# Patient Record
Sex: MALE | Race: WHITE | NOT HISPANIC OR LATINO
[De-identification: names, ages, dates, MRNs, and addresses within clinical notes are randomized per-mention and may not be internally consistent; named-entity substitution may affect disease eponyms.]

---

## 2017-05-05 ENCOUNTER — APPOINTMENT (OUTPATIENT)
Dept: VASCULAR SURGERY | Facility: CLINIC | Age: 52
End: 2017-05-05

## 2017-05-05 VITALS
BODY MASS INDEX: 26.95 KG/M2 | DIASTOLIC BLOOD PRESSURE: 78 MMHG | OXYGEN SATURATION: 96 % | SYSTOLIC BLOOD PRESSURE: 122 MMHG | HEIGHT: 74 IN | HEART RATE: 57 BPM | WEIGHT: 210 LBS

## 2017-05-05 DIAGNOSIS — Z78.9 OTHER SPECIFIED HEALTH STATUS: ICD-10-CM

## 2018-02-23 ENCOUNTER — APPOINTMENT (OUTPATIENT)
Dept: VASCULAR SURGERY | Facility: CLINIC | Age: 53
End: 2018-02-23
Payer: COMMERCIAL

## 2018-02-23 DIAGNOSIS — I83.10 VARICOSE VEINS OF UNSPECIFIED LOWER EXTREMITY WITH INFLAMMATION: ICD-10-CM

## 2018-02-23 PROCEDURE — 99213 OFFICE O/P EST LOW 20 MIN: CPT

## 2018-03-13 ENCOUNTER — OTHER (OUTPATIENT)
Age: 53
End: 2018-03-13

## 2018-04-04 ENCOUNTER — APPOINTMENT (OUTPATIENT)
Dept: VASCULAR SURGERY | Facility: HOSPITAL | Age: 53
End: 2018-04-04

## 2018-04-04 ENCOUNTER — OUTPATIENT (OUTPATIENT)
Dept: OUTPATIENT SERVICES | Facility: HOSPITAL | Age: 53
LOS: 1 days | Discharge: HOME | End: 2018-04-04
Payer: COMMERCIAL

## 2018-04-04 PROCEDURE — 37765 STAB PHLEB VEINS XTR 10-20: CPT | Mod: LT,GC

## 2018-04-16 ENCOUNTER — APPOINTMENT (OUTPATIENT)
Dept: VASCULAR SURGERY | Facility: CLINIC | Age: 53
End: 2018-04-16
Payer: COMMERCIAL

## 2018-04-16 PROCEDURE — 99024 POSTOP FOLLOW-UP VISIT: CPT

## 2018-04-20 ENCOUNTER — OUTPATIENT (OUTPATIENT)
Dept: OUTPATIENT SERVICES | Facility: HOSPITAL | Age: 53
LOS: 1 days | End: 2018-04-20
Payer: COMMERCIAL

## 2018-04-20 DIAGNOSIS — Z01.818 ENCOUNTER FOR OTHER PREPROCEDURAL EXAMINATION: ICD-10-CM

## 2018-04-20 LAB
ALBUMIN SERPL ELPH-MCNC: 5 G/DL — SIGNIFICANT CHANGE UP (ref 3.3–5)
ALP SERPL-CCNC: 73 U/L — SIGNIFICANT CHANGE UP (ref 40–120)
ALT FLD-CCNC: 21 U/L — SIGNIFICANT CHANGE UP (ref 10–45)
ANION GAP SERPL CALC-SCNC: 11 MMOL/L — SIGNIFICANT CHANGE UP (ref 5–17)
APTT BLD: 36 SEC — SIGNIFICANT CHANGE UP (ref 27.5–37.4)
AST SERPL-CCNC: 20 U/L — SIGNIFICANT CHANGE UP (ref 10–40)
BILIRUB SERPL-MCNC: 1 MG/DL — SIGNIFICANT CHANGE UP (ref 0.2–1.2)
BUN SERPL-MCNC: 19 MG/DL — SIGNIFICANT CHANGE UP (ref 7–23)
CALCIUM SERPL-MCNC: 9.8 MG/DL — SIGNIFICANT CHANGE UP (ref 8.4–10.5)
CHLORIDE SERPL-SCNC: 100 MMOL/L — SIGNIFICANT CHANGE UP (ref 96–108)
CO2 SERPL-SCNC: 27 MMOL/L — SIGNIFICANT CHANGE UP (ref 22–31)
CREAT SERPL-MCNC: 0.92 MG/DL — SIGNIFICANT CHANGE UP (ref 0.5–1.3)
GLUCOSE SERPL-MCNC: 92 MG/DL — SIGNIFICANT CHANGE UP (ref 70–99)
HCT VFR BLD CALC: 46.1 % — SIGNIFICANT CHANGE UP (ref 39–50)
HGB BLD-MCNC: 16 G/DL — SIGNIFICANT CHANGE UP (ref 13–17)
INR BLD: 1.01 — SIGNIFICANT CHANGE UP (ref 0.88–1.16)
MCHC RBC-ENTMCNC: 30.9 PG — SIGNIFICANT CHANGE UP (ref 27–34)
MCHC RBC-ENTMCNC: 34.7 G/DL — SIGNIFICANT CHANGE UP (ref 32–36)
MCV RBC AUTO: 89 FL — SIGNIFICANT CHANGE UP (ref 80–100)
PLATELET # BLD AUTO: 180 K/UL — SIGNIFICANT CHANGE UP (ref 150–400)
POTASSIUM SERPL-MCNC: 4.2 MMOL/L — SIGNIFICANT CHANGE UP (ref 3.5–5.3)
POTASSIUM SERPL-SCNC: 4.2 MMOL/L — SIGNIFICANT CHANGE UP (ref 3.5–5.3)
PROT SERPL-MCNC: 7.2 G/DL — SIGNIFICANT CHANGE UP (ref 6–8.3)
PROTHROM AB SERPL-ACNC: 11.2 SEC — SIGNIFICANT CHANGE UP (ref 9.8–12.7)
RBC # BLD: 5.18 M/UL — SIGNIFICANT CHANGE UP (ref 4.2–5.8)
RBC # FLD: 13 % — SIGNIFICANT CHANGE UP (ref 10.3–16.9)
SODIUM SERPL-SCNC: 138 MMOL/L — SIGNIFICANT CHANGE UP (ref 135–145)
WBC # BLD: 3 K/UL — LOW (ref 3.8–10.5)
WBC # FLD AUTO: 3 K/UL — LOW (ref 3.8–10.5)

## 2018-04-20 PROCEDURE — 85610 PROTHROMBIN TIME: CPT

## 2018-04-20 PROCEDURE — 85027 COMPLETE CBC AUTOMATED: CPT

## 2018-04-20 PROCEDURE — 85730 THROMBOPLASTIN TIME PARTIAL: CPT

## 2018-04-20 PROCEDURE — 80053 COMPREHEN METABOLIC PANEL: CPT

## 2018-04-25 ENCOUNTER — OUTPATIENT (OUTPATIENT)
Dept: OUTPATIENT SERVICES | Facility: HOSPITAL | Age: 53
LOS: 1 days | Discharge: HOME | End: 2018-04-25
Payer: COMMERCIAL

## 2018-04-25 ENCOUNTER — RESULT REVIEW (OUTPATIENT)
Age: 53
End: 2018-04-25

## 2018-04-25 ENCOUNTER — APPOINTMENT (OUTPATIENT)
Dept: VASCULAR SURGERY | Facility: HOSPITAL | Age: 53
End: 2018-04-25

## 2018-04-25 PROCEDURE — 37765 STAB PHLEB VEINS XTR 10-20: CPT | Mod: 58,RT,GC

## 2018-05-04 LAB — SURGICAL PATHOLOGY STUDY: SIGNIFICANT CHANGE UP

## 2018-07-28 PROBLEM — I83.10 VARICOSE VEINS WITH INFLAMMATION, UNSPECIFIED LATERALITY: Status: ACTIVE | Noted: 2018-03-01

## 2021-05-18 ENCOUNTER — EMERGENCY (EMERGENCY)
Facility: HOSPITAL | Age: 56
LOS: 1 days | Discharge: HOME | End: 2021-05-18
Attending: EMERGENCY MEDICINE | Admitting: EMERGENCY MEDICINE
Payer: COMMERCIAL

## 2021-05-18 VITALS
SYSTOLIC BLOOD PRESSURE: 154 MMHG | HEIGHT: 74 IN | WEIGHT: 205.03 LBS | HEART RATE: 65 BPM | OXYGEN SATURATION: 98 % | TEMPERATURE: 98 F | DIASTOLIC BLOOD PRESSURE: 88 MMHG | RESPIRATION RATE: 17 BRPM

## 2021-05-18 VITALS
OXYGEN SATURATION: 97 % | RESPIRATION RATE: 16 BRPM | HEART RATE: 56 BPM | TEMPERATURE: 98 F | SYSTOLIC BLOOD PRESSURE: 161 MMHG | DIASTOLIC BLOOD PRESSURE: 88 MMHG

## 2021-05-18 DIAGNOSIS — R00.1 BRADYCARDIA, UNSPECIFIED: ICD-10-CM

## 2021-05-18 DIAGNOSIS — R00.2 PALPITATIONS: ICD-10-CM

## 2021-05-18 DIAGNOSIS — R06.4 HYPERVENTILATION: ICD-10-CM

## 2021-05-18 DIAGNOSIS — R07.89 OTHER CHEST PAIN: ICD-10-CM

## 2021-05-18 LAB
ALBUMIN SERPL ELPH-MCNC: 4.7 G/DL — SIGNIFICANT CHANGE UP (ref 3.3–5)
ALP SERPL-CCNC: 89 U/L — SIGNIFICANT CHANGE UP (ref 40–120)
ALT FLD-CCNC: 18 U/L — SIGNIFICANT CHANGE UP (ref 10–45)
ANION GAP SERPL CALC-SCNC: 12 MMOL/L — SIGNIFICANT CHANGE UP (ref 5–17)
AST SERPL-CCNC: 27 U/L — SIGNIFICANT CHANGE UP (ref 10–40)
BASOPHILS # BLD AUTO: 0.03 K/UL — SIGNIFICANT CHANGE UP (ref 0–0.2)
BASOPHILS NFR BLD AUTO: 0.9 % — SIGNIFICANT CHANGE UP (ref 0–2)
BILIRUB SERPL-MCNC: 0.4 MG/DL — SIGNIFICANT CHANGE UP (ref 0.2–1.2)
BUN SERPL-MCNC: 24 MG/DL — HIGH (ref 7–23)
CALCIUM SERPL-MCNC: 9.3 MG/DL — SIGNIFICANT CHANGE UP (ref 8.4–10.5)
CHLORIDE SERPL-SCNC: 103 MMOL/L — SIGNIFICANT CHANGE UP (ref 96–108)
CO2 SERPL-SCNC: 24 MMOL/L — SIGNIFICANT CHANGE UP (ref 22–31)
CREAT SERPL-MCNC: 0.86 MG/DL — SIGNIFICANT CHANGE UP (ref 0.5–1.3)
EOSINOPHIL # BLD AUTO: 0.15 K/UL — SIGNIFICANT CHANGE UP (ref 0–0.5)
EOSINOPHIL NFR BLD AUTO: 4.4 % — SIGNIFICANT CHANGE UP (ref 0–6)
GLUCOSE SERPL-MCNC: 114 MG/DL — HIGH (ref 70–99)
HCT VFR BLD CALC: 44.6 % — SIGNIFICANT CHANGE UP (ref 39–50)
HGB BLD-MCNC: 15.3 G/DL — SIGNIFICANT CHANGE UP (ref 13–17)
IMM GRANULOCYTES NFR BLD AUTO: 0.3 % — SIGNIFICANT CHANGE UP (ref 0–1.5)
LIDOCAIN IGE QN: 25 U/L — SIGNIFICANT CHANGE UP (ref 7–60)
LYMPHOCYTES # BLD AUTO: 1.28 K/UL — SIGNIFICANT CHANGE UP (ref 1–3.3)
LYMPHOCYTES # BLD AUTO: 37.4 % — SIGNIFICANT CHANGE UP (ref 13–44)
MCHC RBC-ENTMCNC: 30.2 PG — SIGNIFICANT CHANGE UP (ref 27–34)
MCHC RBC-ENTMCNC: 34.3 GM/DL — SIGNIFICANT CHANGE UP (ref 32–36)
MCV RBC AUTO: 88.1 FL — SIGNIFICANT CHANGE UP (ref 80–100)
MONOCYTES # BLD AUTO: 0.41 K/UL — SIGNIFICANT CHANGE UP (ref 0–0.9)
MONOCYTES NFR BLD AUTO: 12 % — SIGNIFICANT CHANGE UP (ref 2–14)
NEUTROPHILS # BLD AUTO: 1.54 K/UL — LOW (ref 1.8–7.4)
NEUTROPHILS NFR BLD AUTO: 45 % — SIGNIFICANT CHANGE UP (ref 43–77)
NRBC # BLD: 0 /100 WBCS — SIGNIFICANT CHANGE UP (ref 0–0)
PLATELET # BLD AUTO: 188 K/UL — SIGNIFICANT CHANGE UP (ref 150–400)
POTASSIUM SERPL-MCNC: 3.8 MMOL/L — SIGNIFICANT CHANGE UP (ref 3.5–5.3)
POTASSIUM SERPL-SCNC: 3.8 MMOL/L — SIGNIFICANT CHANGE UP (ref 3.5–5.3)
PROT SERPL-MCNC: 7.2 G/DL — SIGNIFICANT CHANGE UP (ref 6–8.3)
RBC # BLD: 5.06 M/UL — SIGNIFICANT CHANGE UP (ref 4.2–5.8)
RBC # FLD: 13.1 % — SIGNIFICANT CHANGE UP (ref 10.3–14.5)
SODIUM SERPL-SCNC: 139 MMOL/L — SIGNIFICANT CHANGE UP (ref 135–145)
TROPONIN T SERPL-MCNC: 0.01 NG/ML — SIGNIFICANT CHANGE UP (ref 0–0.01)
TROPONIN T SERPL-MCNC: 0.01 NG/ML — SIGNIFICANT CHANGE UP (ref 0–0.01)
WBC # BLD: 3.42 K/UL — LOW (ref 3.8–10.5)
WBC # FLD AUTO: 3.42 K/UL — LOW (ref 3.8–10.5)

## 2021-05-18 PROCEDURE — 99283 EMERGENCY DEPT VISIT LOW MDM: CPT

## 2021-05-18 PROCEDURE — 93005 ELECTROCARDIOGRAM TRACING: CPT

## 2021-05-18 PROCEDURE — 85025 COMPLETE CBC W/AUTO DIFF WBC: CPT

## 2021-05-18 PROCEDURE — 80053 COMPREHEN METABOLIC PANEL: CPT

## 2021-05-18 PROCEDURE — 36415 COLL VENOUS BLD VENIPUNCTURE: CPT

## 2021-05-18 PROCEDURE — 83690 ASSAY OF LIPASE: CPT

## 2021-05-18 PROCEDURE — 84484 ASSAY OF TROPONIN QUANT: CPT

## 2021-05-18 PROCEDURE — 99285 EMERGENCY DEPT VISIT HI MDM: CPT

## 2021-05-18 PROCEDURE — 93010 ELECTROCARDIOGRAM REPORT: CPT | Mod: NC

## 2021-05-18 RX ORDER — SODIUM CHLORIDE 9 MG/ML
1000 INJECTION INTRAMUSCULAR; INTRAVENOUS; SUBCUTANEOUS ONCE
Refills: 0 | Status: COMPLETED | OUTPATIENT
Start: 2021-05-18 | End: 2021-05-18

## 2021-05-18 RX ADMIN — SODIUM CHLORIDE 1000 MILLILITER(S): 9 INJECTION INTRAMUSCULAR; INTRAVENOUS; SUBCUTANEOUS at 03:26

## 2021-05-18 NOTE — ED PROVIDER NOTE - NSFOLLOWUPINSTRUCTIONS_ED_ALL_ED_FT
Palpitations    A palpitation is the feeling that your heartbeat is irregular or is faster than normal. It may feel like your heart is fluttering or skipping a beat. They may be caused by many things, including smoking, caffeine, alcohol, stress, and certain medicines. Although most causes of palpitations are not serious, palpitations can be a sign of a serious medical problem. Avoid caffeine, alcohol, and tobacco products at home. Try to reduce stress and anxiety and make sure to get plenty of rest.     SEEK IMMEDIATE MEDICAL CARE IF YOU HAVE ANY OF THE FOLLOWING SYMPTOMS: chest pain, shortness of breath, severe headache, dizziness/lightheadedness, or fainting. Palpitations    A palpitation is the feeling that your heartbeat is irregular or is faster than normal. It may feel like your heart is fluttering or skipping a beat. They may be caused by many things, including smoking, caffeine, alcohol, stress, and certain medicines. Although most causes of palpitations are not serious, palpitations can be a sign of a serious medical problem. Avoid caffeine, alcohol, and tobacco products at home. Try to reduce stress and anxiety and make sure to get plenty of rest.     SEEK IMMEDIATE MEDICAL CARE IF YOU HAVE ANY OF THE FOLLOWING SYMPTOMS: chest pain, shortness of breath, severe headache, dizziness/lightheadedness, or fainting.  Anxiety    Generalized anxiety disorder (THIAGO) is a mental disorder. It is defined as anxiety that is not necessarily related to specific events or activities or is out of proportion to said events. Symptoms include restlessness, fatigue, difficulty concentrations, irritability and difficulty concentrating. It may interfere with life functions, including relationships, work, and school. If you were started on a medication, make sure to take exactly as prescribed and follow up with a psychiatrist.    SEEK IMMEDIATE MEDICAL CARE IF YOU HAVE ANY OF THE FOLLOWING SYMPTOMS: thoughts about hurting killing yourself, thoughts about hurting or killing somebody else, hallucinations, or worsening depression.

## 2021-05-18 NOTE — ED PROVIDER NOTE - OBJECTIVE STATEMENT
The pt is a 57 y/o M, BIBA, c/o palpitations and The pt is a 57 y/o M, BIBA, c/o palpitations and fast breathing PTA. Pt states that was eating, suddenly developed L sided chest pressure, heart racing, and started to hyperventilate, symptoms lasted a few min and self resolved, currently feeling well, states that had same episode a wk ago. Has been taking supplements and working out over past wk, drinks 1 coffee/day. Denies cp at this time, sob, dyspnea, n/v/d, abd pain, dizziness, syncope, fevers, chills.

## 2021-05-18 NOTE — ED PROVIDER NOTE - PATIENT PORTAL LINK FT
You can access the FollowMyHealth Patient Portal offered by [facility] by registering at the following website: [email or web address]. By joining [employer]’s FollowMyHealth portal, you will also be able to view your health information using other applications (apps) compatible with our system.

## 2021-05-18 NOTE — ED PROVIDER NOTE - CARE PROVIDER_API CALL
Steven Palacios)  Cardiovascular Disease; Internal Medicine  Cardiology [facility], [address] [place] [address]  Phone: [phone]  Fax: [phone]  Follow Up Time:     Freddie Reyes [facility]  [address] [place] [address]  Phone: [phone]  Fax: [phone]  Follow Up Time:

## 2021-05-18 NOTE — ED ADULT NURSE NOTE - OBJECTIVE STATEMENT
Presenting to ED c/o chest pain and fast heart beat. Reports L sided CP started a few hours ago, followed by fast heart beat and dizziness. Denies prior medical hx, denies cardiac hx. No N/V. No diaphoresis. Reports CP has improved since. No other c/o voiced.

## 2021-05-18 NOTE — ED PROVIDER NOTE - CARE PROVIDERS DIRECT ADDRESSES
[email or web address]

## 2021-05-18 NOTE — ED PROVIDER NOTE - ATTENDING CONTRIBUTION TO CARE
episode of left sided chest discomfort followed by palpitaitons/ lightheaded/ chest tightness. lasted a few min and subsided. no syncope. asymptomatic in ed.  troponin neg x2 and ecg non ischemic making acs unlikely.  to f/u with cardiology. suspect anxiety, says he has had before.  return precautions discussed

## 2021-05-18 NOTE — ED PROVIDER NOTE - CLINICAL SUMMARY MEDICAL DECISION MAKING FREE TEXT BOX
pt c/o an episode of palpitations, hyperventilation PTA - all symptoms self resolved w/i min, currently asymptomatic, had same episode a wk ago, ekg sinus danny, labs   , given ivf, observed in ED w/o any symptoms, ? palpitations vs anxiety, no concern for acs or pe, hemodynamically stable, nad, stable for dc, to f/u w/pmd, pt understands and agrees w/plan, strict return precautions given pt c/o an episode of palpitations, hyperventilation PTA - all symptoms self resolved w/i min, currently asymptomatic, had same episode a wk ago, ekg sinus danny, labs wnl - trop neg x2, given ivf, observed in ED w/o any symptoms, ? palpitations vs anxiety, no concern for acs or pe, hemodynamically stable, nad, stable for dc, to f/u w/pmd, pt understands and agrees w/plan, strict return precautions given

## 2021-05-21 PROBLEM — Z78.9 OTHER SPECIFIED HEALTH STATUS: Chronic | Status: ACTIVE | Noted: 2021-05-18

## 2021-06-22 ENCOUNTER — APPOINTMENT (OUTPATIENT)
Dept: HEART AND VASCULAR | Facility: CLINIC | Age: 56
End: 2021-06-22
Payer: COMMERCIAL

## 2021-06-22 ENCOUNTER — NON-APPOINTMENT (OUTPATIENT)
Age: 56
End: 2021-06-22

## 2021-06-22 VITALS
DIASTOLIC BLOOD PRESSURE: 80 MMHG | SYSTOLIC BLOOD PRESSURE: 120 MMHG | HEART RATE: 77 BPM | TEMPERATURE: 97.8 F | HEIGHT: 74 IN | BODY MASS INDEX: 25.67 KG/M2 | WEIGHT: 200 LBS | OXYGEN SATURATION: 97 %

## 2021-06-22 DIAGNOSIS — Z80.9 FAMILY HISTORY OF MALIGNANT NEOPLASM, UNSPECIFIED: ICD-10-CM

## 2021-06-22 DIAGNOSIS — Z80.0 FAMILY HISTORY OF MALIGNANT NEOPLASM OF DIGESTIVE ORGANS: ICD-10-CM

## 2021-06-22 DIAGNOSIS — Z82.49 FAMILY HISTORY OF ISCHEMIC HEART DISEASE AND OTHER DISEASES OF THE CIRCULATORY SYSTEM: ICD-10-CM

## 2021-06-22 PROCEDURE — 99072 ADDL SUPL MATRL&STAF TM PHE: CPT

## 2021-06-22 PROCEDURE — 93000 ELECTROCARDIOGRAM COMPLETE: CPT

## 2021-06-22 PROCEDURE — 99203 OFFICE O/P NEW LOW 30 MIN: CPT

## 2021-06-22 NOTE — ASSESSMENT
[FreeTextEntry1] : Palps- c/w PSVT, ZIO patch and echo, lifestyle good.\par \par CP- Atypical- EST ordered, lipids UNK, glucose 114\par \par Aden Maintainence- rec he get the corona virus vaccine

## 2021-06-22 NOTE — PHYSICAL EXAM
[Well Developed] : well developed [Well Nourished] : well nourished [No Acute Distress] : no acute distress [Normal Conjunctiva] : normal conjunctiva [Normal S1, S2] : normal S1, S2 [No Murmur] : no murmur [No Rub] : no rub [No Gallop] : no gallop [Clear Lung Fields] : clear lung fields [Good Air Entry] : good air entry [No Respiratory Distress] : no respiratory distress  [Soft] : abdomen soft [Non Tender] : non-tender [No Masses/organomegaly] : no masses/organomegaly [Normal Bowel Sounds] : normal bowel sounds [Normal Gait] : normal gait [No Edema] : no edema [No Cyanosis] : no cyanosis [No Clubbing] : no clubbing [No Varicosities] : no varicosities [No Rash] : no rash [No Skin Lesions] : no skin lesions [Moves all extremities] : moves all extremities [No Focal Deficits] : no focal deficits [Normal Speech] : normal speech [Alert and Oriented] : alert and oriented [Normal memory] : normal memory [de-identified] : No JVD or bruits

## 2021-06-22 NOTE — REVIEW OF SYSTEMS
[SOB] : shortness of breath [Chest Discomfort] : chest discomfort [Palpitations] : palpitations [Under Stress] : under stress [Negative] : Heme/Lymph

## 2021-08-04 ENCOUNTER — APPOINTMENT (OUTPATIENT)
Dept: HEART AND VASCULAR | Facility: CLINIC | Age: 56
End: 2021-08-04

## 2021-08-25 ENCOUNTER — APPOINTMENT (OUTPATIENT)
Dept: HEART AND VASCULAR | Facility: CLINIC | Age: 56
End: 2021-08-25

## 2021-09-13 ENCOUNTER — APPOINTMENT (OUTPATIENT)
Dept: HEART AND VASCULAR | Facility: CLINIC | Age: 56
End: 2021-09-13
Payer: COMMERCIAL

## 2021-09-13 ENCOUNTER — APPOINTMENT (OUTPATIENT)
Dept: HEART AND VASCULAR | Facility: CLINIC | Age: 56
End: 2021-09-13

## 2021-09-13 VITALS
WEIGHT: 204.04 LBS | DIASTOLIC BLOOD PRESSURE: 74 MMHG | BODY MASS INDEX: 26.19 KG/M2 | HEART RATE: 70 BPM | OXYGEN SATURATION: 96 % | RESPIRATION RATE: 16 BRPM | HEIGHT: 74 IN | TEMPERATURE: 97.5 F | SYSTOLIC BLOOD PRESSURE: 120 MMHG

## 2021-09-13 DIAGNOSIS — R07.9 CHEST PAIN, UNSPECIFIED: ICD-10-CM

## 2021-09-13 DIAGNOSIS — R00.2 PALPITATIONS: ICD-10-CM

## 2021-09-13 PROCEDURE — 93015 CV STRESS TEST SUPVJ I&R: CPT

## 2021-09-13 PROCEDURE — 93306 TTE W/DOPPLER COMPLETE: CPT

## 2021-11-20 NOTE — REASON FOR VISIT
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  Got LH.  Also reports h/o palps and a ? panic attack.  Never smoked.  BP excellent. He went to [facility], trop (-), EKG NL.  Under stress, mother was ill, discord with wife, son  last year.

## 2022-03-31 ENCOUNTER — APPOINTMENT (OUTPATIENT)
Dept: HEART AND VASCULAR | Facility: CLINIC | Age: 57
End: 2022-03-31

## 2024-02-10 ENCOUNTER — NON-APPOINTMENT (OUTPATIENT)
Age: 59
End: 2024-02-10

## 2024-02-13 ENCOUNTER — APPOINTMENT (OUTPATIENT)
Dept: OTOLARYNGOLOGY | Facility: CLINIC | Age: 59
End: 2024-02-13

## 2024-02-28 ENCOUNTER — APPOINTMENT (OUTPATIENT)
Dept: OTOLARYNGOLOGY | Facility: CLINIC | Age: 59
End: 2024-02-28
Payer: COMMERCIAL

## 2024-02-28 VITALS
HEART RATE: 60 BPM | SYSTOLIC BLOOD PRESSURE: 136 MMHG | OXYGEN SATURATION: 97 % | HEIGHT: 74 IN | TEMPERATURE: 97.7 F | DIASTOLIC BLOOD PRESSURE: 87 MMHG | WEIGHT: 200 LBS | BODY MASS INDEX: 25.67 KG/M2

## 2024-02-28 DIAGNOSIS — Z78.9 OTHER SPECIFIED HEALTH STATUS: ICD-10-CM

## 2024-02-28 DIAGNOSIS — Z87.09 PERSONAL HISTORY OF OTHER DISEASES OF THE RESPIRATORY SYSTEM: ICD-10-CM

## 2024-02-28 DIAGNOSIS — J32.9 CHRONIC SINUSITIS, UNSPECIFIED: ICD-10-CM

## 2024-02-28 PROCEDURE — 99203 OFFICE O/P NEW LOW 30 MIN: CPT | Mod: 25

## 2024-02-28 PROCEDURE — 31231 NASAL ENDOSCOPY DX: CPT

## 2024-02-28 NOTE — HISTORY OF PRESENT ILLNESS
[de-identified] : 59-year-old male c/o sinus infection since a year ago. Pt states having recurrent sinus infection twice per year. Cold weather worsens symptoms. No headaches or cough. Right and frontal sinus pressure.

## 2024-02-28 NOTE — PROCEDURE
[Topical Lidocaine] : topical lidocaine [Flexible Endoscope] : examined with the flexible endoscope [FreeTextEntry6] : Small septal perforation. Clear and open sinus passages. No infection noted. s[ FESS DR Saucedo? 10 yrs ago Open windows get CT and possible sinus wash

## 2024-03-12 ENCOUNTER — APPOINTMENT (OUTPATIENT)
Dept: OTOLARYNGOLOGY | Facility: CLINIC | Age: 59
End: 2024-03-12
Payer: COMMERCIAL

## 2024-03-12 PROCEDURE — 99213 OFFICE O/P EST LOW 20 MIN: CPT

## 2024-03-12 NOTE — REASON FOR VISIT
[Subsequent Evaluation] : a subsequent evaluation for [FreeTextEntry2] : CT sinuses w CRS and DNS severe w perf

## 2024-03-21 ENCOUNTER — APPOINTMENT (OUTPATIENT)
Dept: OTOLARYNGOLOGY | Facility: CLINIC | Age: 59
End: 2024-03-21
Payer: COMMERCIAL

## 2024-03-21 PROCEDURE — 31000 IRRIGATION MAXILLARY SINUS: CPT | Mod: 50

## 2024-03-21 NOTE — PHYSICAL EXAM
[Midline] : trachea located in midline position [Normal] : orientation to person, place, and time: normal [] : septum deviated bilaterally [de-identified] : s[ FESS DR Saucedo? 10 yrs ago

## 2024-03-21 NOTE — PROCEDURE
[Topical Lidocaine] : topical lidocaine [FreeTextEntry6] : Persistent severe CRS sinus wash and steroid bath [Flexible Endoscope] : examined with the flexible endoscope

## 2025-01-25 ENCOUNTER — INPATIENT (INPATIENT)
Facility: HOSPITAL | Age: 60
LOS: 1 days | Discharge: HOME | End: 2025-01-27
Attending: STUDENT IN AN ORGANIZED HEALTH CARE EDUCATION/TRAINING PROGRAM | Admitting: STUDENT IN AN ORGANIZED HEALTH CARE EDUCATION/TRAINING PROGRAM
Payer: COMMERCIAL

## 2025-01-25 VITALS
DIASTOLIC BLOOD PRESSURE: 92 MMHG | HEIGHT: 74 IN | WEIGHT: 210.1 LBS | HEART RATE: 68 BPM | OXYGEN SATURATION: 97 % | RESPIRATION RATE: 18 BRPM | TEMPERATURE: 97 F | SYSTOLIC BLOOD PRESSURE: 159 MMHG

## 2025-01-25 LAB
ALBUMIN SERPL ELPH-MCNC: 4.5 G/DL — SIGNIFICANT CHANGE UP (ref 3.3–5)
ALP SERPL-CCNC: 77 U/L — SIGNIFICANT CHANGE UP (ref 40–120)
ALT FLD-CCNC: 20 U/L — SIGNIFICANT CHANGE UP (ref 10–45)
ANION GAP SERPL CALC-SCNC: 9 MMOL/L — SIGNIFICANT CHANGE UP (ref 5–17)
APTT BLD: 34.2 SEC — SIGNIFICANT CHANGE UP (ref 24.5–35.6)
AST SERPL-CCNC: 19 U/L — SIGNIFICANT CHANGE UP (ref 10–40)
BASOPHILS # BLD AUTO: 0.02 K/UL — SIGNIFICANT CHANGE UP (ref 0–0.2)
BASOPHILS NFR BLD AUTO: 0.6 % — SIGNIFICANT CHANGE UP (ref 0–2)
BILIRUB SERPL-MCNC: 0.5 MG/DL — SIGNIFICANT CHANGE UP (ref 0.2–1.2)
BUN SERPL-MCNC: 14 MG/DL — SIGNIFICANT CHANGE UP (ref 7–23)
CALCIUM SERPL-MCNC: 9.2 MG/DL — SIGNIFICANT CHANGE UP (ref 8.4–10.5)
CHLORIDE SERPL-SCNC: 104 MMOL/L — SIGNIFICANT CHANGE UP (ref 96–108)
CHOLEST SERPL-MCNC: 219 MG/DL — HIGH
CO2 SERPL-SCNC: 26 MMOL/L — SIGNIFICANT CHANGE UP (ref 22–31)
CREAT SERPL-MCNC: 0.87 MG/DL — SIGNIFICANT CHANGE UP (ref 0.5–1.3)
CRP SERPL-MCNC: <3 MG/L — SIGNIFICANT CHANGE UP (ref 0–4)
EGFR: 100 ML/MIN/1.73M2 — SIGNIFICANT CHANGE UP
EOSINOPHIL # BLD AUTO: 0.09 K/UL — SIGNIFICANT CHANGE UP (ref 0–0.5)
EOSINOPHIL NFR BLD AUTO: 2.7 % — SIGNIFICANT CHANGE UP (ref 0–6)
ERYTHROCYTE [SEDIMENTATION RATE] IN BLOOD: 3 MM/HR — SIGNIFICANT CHANGE UP
GLUCOSE SERPL-MCNC: 105 MG/DL — HIGH (ref 70–99)
HCT VFR BLD CALC: 45 % — SIGNIFICANT CHANGE UP (ref 39–50)
HDLC SERPL-MCNC: 51 MG/DL — SIGNIFICANT CHANGE UP
HGB BLD-MCNC: 14.9 G/DL — SIGNIFICANT CHANGE UP (ref 13–17)
IMM GRANULOCYTES NFR BLD AUTO: 0.3 % — SIGNIFICANT CHANGE UP (ref 0–0.9)
INR BLD: 0.96 — SIGNIFICANT CHANGE UP (ref 0.85–1.16)
LIPID PNL WITH DIRECT LDL SERPL: 141 MG/DL — HIGH
LYMPHOCYTES # BLD AUTO: 1.29 K/UL — SIGNIFICANT CHANGE UP (ref 1–3.3)
LYMPHOCYTES # BLD AUTO: 38.7 % — SIGNIFICANT CHANGE UP (ref 13–44)
MCHC RBC-ENTMCNC: 29.7 PG — SIGNIFICANT CHANGE UP (ref 27–34)
MCHC RBC-ENTMCNC: 33.1 G/DL — SIGNIFICANT CHANGE UP (ref 32–36)
MCV RBC AUTO: 89.6 FL — SIGNIFICANT CHANGE UP (ref 80–100)
MONOCYTES # BLD AUTO: 0.3 K/UL — SIGNIFICANT CHANGE UP (ref 0–0.9)
MONOCYTES NFR BLD AUTO: 9 % — SIGNIFICANT CHANGE UP (ref 2–14)
NEUTROPHILS # BLD AUTO: 1.62 K/UL — LOW (ref 1.8–7.4)
NEUTROPHILS NFR BLD AUTO: 48.7 % — SIGNIFICANT CHANGE UP (ref 43–77)
NON HDL CHOLESTEROL: 168 MG/DL — HIGH
NRBC # BLD: 0 /100 WBCS — SIGNIFICANT CHANGE UP (ref 0–0)
NRBC BLD-RTO: 0 /100 WBCS — SIGNIFICANT CHANGE UP (ref 0–0)
PLATELET # BLD AUTO: 169 K/UL — SIGNIFICANT CHANGE UP (ref 150–400)
POTASSIUM SERPL-MCNC: 4.3 MMOL/L — SIGNIFICANT CHANGE UP (ref 3.5–5.3)
POTASSIUM SERPL-SCNC: 4.3 MMOL/L — SIGNIFICANT CHANGE UP (ref 3.5–5.3)
PROT SERPL-MCNC: 7 G/DL — SIGNIFICANT CHANGE UP (ref 6–8.3)
PROTHROM AB SERPL-ACNC: 11.2 SEC — SIGNIFICANT CHANGE UP (ref 9.9–13.4)
RBC # BLD: 5.02 M/UL — SIGNIFICANT CHANGE UP (ref 4.2–5.8)
RBC # FLD: 13.2 % — SIGNIFICANT CHANGE UP (ref 10.3–14.5)
SODIUM SERPL-SCNC: 139 MMOL/L — SIGNIFICANT CHANGE UP (ref 135–145)
TRIGL SERPL-MCNC: 151 MG/DL — HIGH
WBC # BLD: 3.33 K/UL — LOW (ref 3.8–10.5)
WBC # FLD AUTO: 3.33 K/UL — LOW (ref 3.8–10.5)

## 2025-01-25 PROCEDURE — 70450 CT HEAD/BRAIN W/O DYE: CPT | Mod: 26,59

## 2025-01-25 PROCEDURE — 71045 X-RAY EXAM CHEST 1 VIEW: CPT | Mod: 26

## 2025-01-25 PROCEDURE — 70543 MRI ORBT/FAC/NCK W/O &W/DYE: CPT | Mod: 26

## 2025-01-25 PROCEDURE — 70498 CT ANGIOGRAPHY NECK: CPT | Mod: 26

## 2025-01-25 PROCEDURE — 70496 CT ANGIOGRAPHY HEAD: CPT | Mod: 26

## 2025-01-25 PROCEDURE — 99285 EMERGENCY DEPT VISIT HI MDM: CPT

## 2025-01-25 PROCEDURE — 70553 MRI BRAIN STEM W/O & W/DYE: CPT | Mod: 26

## 2025-01-25 RX ORDER — FLUORESCEIN SODIUM 0.6 MG/1
1 STRIP OPHTHALMIC ONCE
Refills: 0 | Status: COMPLETED | OUTPATIENT
Start: 2025-01-25 | End: 2025-01-25

## 2025-01-25 RX ADMIN — FLUORESCEIN SODIUM 1 APPLICATION(S): 0.6 STRIP OPHTHALMIC at 16:23

## 2025-01-25 NOTE — CONSULT NOTE ADULT - SUBJECTIVE AND OBJECTIVE BOX
Symptoms progressive worsening over the past 3 days. Initially started at blurry vision and has progressed to loss of vision in the inferior VF in the left eye. In the morning, bottom half of VF in the left eye is black and then improves during day with deficit only in the lower nasal quadrant in the left eye. Patient denies recent trauma, head injury, new weakness/numbness in arms and legs, slurred speech. Patient does not take any medications. Recent PCP visit 1 month ago with elevated cholesterol ("cholesterol level in the 400s"). PCP recommended starting medication however patient wanted to try dietary changes first. No smoking, alcohol use 1 beer/week.   **STROKE CONSULT NOTE**    HPI: 60y Male with PMHx of hx of HLD sent to the ED by optometrist for left eye dic edema that presented as left eye blurry vision associated with flashes and floaters x 3days. Gradual onset of blurry visoin. Initially started at blurry vision and has progressed to loss of vision in the inferior VF in the left eye. In the mornings, bottom half of VF in the left eye is black and then improves during day with deficit only in the lower nasal quadrant in the left eye. Patient denies recent trauma, head injury, new weakness/numbness in arms and legs, slurred speech. Patient does not take any medications. Recent PCP visit 1 month ago with elevated cholesterol ("cholesterol level in the 400s"). PCP recommended starting medication however patient wanted to try dietary changes first.     T(C): 36.4 (25 @ 14:04), Max: 36.4 (25 @ 14:04)  HR: 59 (25 @ 14:04) (59 - 68)  BP: 131/78 (25 @ 14:04) (131/78 - 159/92)  RR: 18 (25 @ 14:04) (18 - 18)  SpO2: 97% (25 @ 14:04) (97% - 97%)    PAST MEDICAL & SURGICAL HISTORY:  No pertinent past medical history          FAMILY HISTORY:      SOCIAL HISTORY:   Smoking status: no   Drinkin beer/week  Drug Use: no    ROS:   Constitutional: No fever, weight loss or fatigue  Eyes: No eye pain, visual disturbances, or discharge  ENMT:  No difficulty hearing, tinnitus; No sinus or throat pain  Neck: No pain or stiffness  Respiratory: No cough, wheezing, chills or hemoptysis  Cardiovascular: No chest pain, palpitations, shortness of breath, or leg swelling  Gastrointestinal: No abdominal pain. No nausea, vomiting or hematemesis; No diarrhea or constipation. Nohematochezia.  Genitourinary: No dysuria, frequency, hematuria or incontinence  Neurological: As per HPI      MEDICATIONS  (STANDING):    MEDICATIONS  (PRN):    Allergies    No Known Allergies    Intolerances      Vital Signs Last 24 Hrs  T(C): 36.4 (2025 14:04), Max: 36.4 (2025 14:04)  T(F): 97.5 (2025 14:04), Max: 97.5 (2025 14:04)  HR: 59 (2025 14:04) (59 - 68)  BP: 131/78 (2025 14:04) (131/78 - 159/92)  BP(mean): --  RR: 18 (2025 14:04) (18 - 18)  SpO2: 97% (2025 14:04) (97% - 97%)    Parameters below as of 2025 14:04  Patient On (Oxygen Delivery Method): room air        Physical exam:  Constitutional: No acute distress, conversant  Eyes: Anicteric sclerae, moist conjunctivae, see below for CNs  Neck: trachea midline, FROM, supple, no thyromegaly or lymphadenopathy  Pulmonary:  No use of accessory muscles  GI: Abdomen soft, non-distended, non-tender  Extremities: no edema    Neurologic:  -Mental status: Awake, alert, oriented to person, place, and time. Speech is fluent with intact naming, repetition, and comprehension, no dysarthria. Follows commands.   -Cranial nerves:   II: Visual fields are full to confrontation in right eye. Left eye with field cut in bottom half  III, IV, VI: Extraocular movements are intact without nystagmus. Pupils dilated b/l (from dilation drops after ophthalmology exam)  V:  Facial sensation V1-V3 equal and intact   VII: Face is symmetric with normal eye closure and smile  VIII: Hearing is bilaterally intact   Motor: Normal bulk and tone. No pronator drift. Strength bilateral upper extremity 5/5, bilateral lower extremities 5/5.  Sensation: Intact to light touch bilaterally. No neglect or extinction on double simultaneous testing.  Coordination: No dysmetria on finger-to-nose bilaterally    NIHSS: 1    Fingerstick Blood Glucose: CAPILLARY BLOOD GLUCOSE        LABS:                        14.9   3.33  )-----------( 169      ( 2025 16:18 )             45.0     -    139  |  104  |  14  ----------------------------<  105[H]  4.3   |  26  |  0.87    Ca    9.2      2025 16:18    TPro  7.0  /  Alb  4.5  /  TBili  0.5  /  DBili  x   /  AST  19  /  ALT  20  /  AlkPhos  77            Urinalysis Basic - ( 2025 16:18 )    Color: x / Appearance: x / SG: x / pH: x  Gluc: 105 mg/dL / Ketone: x  / Bili: x / Urobili: x   Blood: x / Protein: x / Nitrite: x   Leuk Esterase: x / RBC: x / WBC x   Sq Epi: x / Non Sq Epi: x / Bacteria: x        RADIOLOGY & ADDITIONAL STUDIES:

## 2025-01-25 NOTE — ED PROVIDER NOTE - PHYSICAL EXAMINATION
General:  Well appearing, no distress  HEENT:  No conjunctival injection, neck supple, no congestion   Chest:  Non-tender, no crepitance  Lungs:  Clear to auscultation bilaterally   Heart:  s1s2 normal, no murmur  Abdomen:  soft, non-tender, non-distended  :  Deferred  Rectal:  Deferred  Extremities: No edema, normal perfusion, no joint swelling or tenderness  Neuro:  Alert, conversant with clear, fluent speech, no facial droop or rash, no drift, motor/sensory intact General:  Well appearing, no distress  HEENT:  No conjunctival injection, neck supple, no congestion.  Full exam by Ophtho.  See note    Chest:  Non-tender, no crepitance  Lungs:  Clear to auscultation bilaterally   Heart:  s1s2 normal, no murmur  Abdomen:  soft, non-tender, non-distended  :  Deferred  Rectal:  Deferred  Extremities: No edema, normal perfusion, no joint swelling or tenderness  Neuro:  Alert, conversant with clear, fluent speech, no facial droop or rash, no drift, motor/sensory intact

## 2025-01-25 NOTE — ED ADULT NURSE NOTE - PAIN: PRESENCE, MLM
[FreeTextEntry1] : 31 yo G0 for [employer]. Denies any complaints at this time. Last GYN visit was approx 1 year ago, reports normal pap smear at that time. Denies h/o abnormal pap smears.  GYnhx: h/o dermoid cyst s/p laparoscopic removal at 20 yo, 8 cm and 12cm in size- denies complications.  H/o abnormal pap smear and colposcopy w/ LGSIL and normal pap smear after.  Sexually active using OCP's for birth control and happy w/ method. Desires pregnancy next 1-2 years. 
denies pain/discomfort (Rating = 0)

## 2025-01-25 NOTE — CONSULT NOTE ADULT - ASSESSMENT
60y Male with PMHx of hx of HLD sent to the ED by optometrist for left eye dic edema that presented as left eye blurry vision associated with flashes and floaters x 3days. Stroke consulted after ophthalmology evaluation c/f NAION. NIHSS 1 for visual field cut. ESR/CRP wnl.     - agree with CTH, CTA. For stroke assessment, would only need MRI brain non con short stroke protocol.  - if imaging negative for stroke, patient can be discharge on aspirin 81mg daily and atorvastatin 80mg daily. Patient can then follow up with stroke clinic. Office will call to schedule  - recommend discussing with gen neuro if patient needs an LP     Will f/u imaging results.  Case discussed with Dr. Ureña

## 2025-01-25 NOTE — ED ADULT TRIAGE NOTE - CHIEF COMPLAINT QUOTE
Pt presents to ED here for floaters and blurry vision L eye x 3 days pt was seen by his eye doctor and was told to come here for possible optic disc edema and tortous vessels OS. Pt A&Ox4, NAD and ambulatory.

## 2025-01-25 NOTE — ED PROVIDER NOTE - CLINICAL SUMMARY MEDICAL DECISION MAKING FREE TEXT BOX
59 yo M with visual changes to the left eye.  Case d/w Ophtho. who will see the patient in the ER. 59 yo M with visual changes to the left eye.  Case d/w Ophtho. who will see the patient in the ER.    Ophthalmology recommended further work up with labs, ct/cta, MR.  Orders placed and Neuro consult requested. 61 yo M with visual changes to the left eye.  Case d/w Ophtho. who will see the patient in the ER.    Ophthalmology recommended further work up with labs, ct/cta, MR.  Orders placed and Neuro consult requested.    Patient was evaluated by Ophthalmology, Stroke Neurology and then General Neurology with the final decision to admit for steroid treatment and further work up.  Patient remained well throughout ER visit and was informed of results and plan.

## 2025-01-25 NOTE — CONSULT NOTE ADULT - ASSESSMENT
60y male w/ pmhx/ochx of HLD presenting with inferior visual cut defect and blurry vision OS of 3 days duration. Sent in from optometrist office for optic disc edema OS.     # optic disc edema OS  - findings suggestive of NAION, less likely AAION vs. compressive lesion  - obtain CBC, ESR, CRP, lipid profile  - CTA head and neck  - MRI brain and orbits w/w/o con  - recommend neurology consult    *incomplete*    Outpatient follow-up: Patient should follow-up with his/her ophthalmologist or with [facility] Department of Ophthalmology upon discharge at the address below     [facility]  [address] [place] [address] [phone] 60y male w/ pmhx/ochx of HLD presenting with inferior visual cut defect and blurry vision OS of 3 days duration. Sent in from optometrist office for optic disc edema OS.     # optic disc edema OS  - rAPD, inferior hemidefect, and optic disc edema OS suggestive of NAION. Less likely AAION (denies GCA symptoms) vs. compressive lesion  - obtain CBC, ESR, CRP, lipid profile  - CTA head and neck  - MRI brain and orbits w/w/o con  - recommend neurology consult  - recommend follow-up with neuro-ophthalmology outpatient after discharge    Outpatient follow-up: Patient should follow-up with his/her ophthalmologist or with [facility] Department of Ophthalmology upon discharge at the address below     [facility]  [address] [place] [address] [phone] 60y male w/ pmhx/ochx of HLD presenting with inferior visual cut defect and blurry vision OS of 3 days duration. Sent in from optometrist office for optic disc edema OS.     # optic disc edema OS  - rAPD, inferior hemidefect, and optic disc edema OS suggestive of NAION. Less likely AAION (denies GCA symptoms) vs. compressive lesion  - obtain CBC, ESR, CRP, lipid profile  - CTA head and neck  - MRI brain and orbits w/w/o con  - recommend neurology consult for further CVA workup  - recommend follow-up with neuro-ophthalmology outpatient after discharge    Outpatient follow-up: Patient should follow-up with his/her ophthalmologist or with [facility] Department of Ophthalmology upon discharge at the address below     [facility] Op[facility]  [address] [place] [address] [phone]

## 2025-01-25 NOTE — ED PROVIDER NOTE - OBJECTIVE STATEMENT
59 yo M no PMH sent from an Optometrist for further evaluation of visual changes  Patient has noted foggy vision to 1/4-1/3 of his left eye x 3 days  He sometimes sees flashes of light and floaters which are worse in the morning  Optometrist's note reports optic disc edema and tortuous vessels with IOP 16/14  No: ha, numbness, weakness, changes in vision, speech or gait, cp, sob or other symptoms

## 2025-01-25 NOTE — ED ADULT NURSE NOTE - OBJECTIVE STATEMENT
Pt is a 61 yo M c/o intermittent floaters and blurry vision to R eye x 3 days. Denies pain, headache, speech changes, weakness. does not wear any corrective lenses. denies any recent injury/trauma, use of eye drops.  pt in nad, respirations even and unlabored. speaking in full and complete sentences. ambulatory independently with steady gait.  visual acuity performed and documented.

## 2025-01-25 NOTE — CONSULT NOTE ADULT - SUBJECTIVE AND OBJECTIVE BOX
[facility] DEPARTMENT OF OPHTHALMOLOGY - INITIAL ADULT CONSULT  -----------------------------------------------------------------------------------------------------------  Brandee Watson MD  -----------------------------------------------------------------------------------------------------------    HPI: 60M PMH HLD sent in from optometrist office Dr. Elo Lerma OD for optic disc edema OS. Pt reporting waking up with blurry vision in his inferior visual field left eye 3 days ago. Denies any inciting events. Reports he used to snore in the past but wife has not mentioned snoring recently. No new medications. Visual field defect was constant also associated with flashes and floaters. No curtain falling. Denies pain. Denies headaches, scalp tenderness, jaw claudication. Went to optometrist office today, undilated exam significant for optic disc edema OS with tortuous vessels - sent to ED for further workup. Last PCP visit 12/2024. Last eye exam 2 years ago - unremarkable exam. Overall feels well, no recent illnesses, sick contacts.      PAST MEDICAL & SURGICAL HISTORY:  No pertinent past medical history        Past Ocular History: denies surg/laser  Ophthalmic Medications: none  FAMILY HISTORY:   no glc/amd  Social History: no etoh/tobacco    MEDICATIONS  (STANDING):    MEDICATIONS  (PRN):    Allergies & Intolerances:   No Known Allergies    Review of Systems:  Constitutional: No fever, chills  Eyes: +blurry vision OS, +flashes, +floaters OS. No FBS, erythema, discharge, double vision, OU  Neuro: No tremors  Cardiovascular: No chest pain, palpitations  Respiratory: No SOB, no cough  GI: No nausea, vomiting, abdominal pain  : No dysuria  Skin: no rash  Psych: no depression  Endocrine: no polyuria, polydipsia  Heme/lymph: no swelling    VITALS: T(C): 36.4 (01-25-25 @ 14:04)  T(F): 97.5 (01-25-25 @ 14:04), Max: 97.5 (01-25-25 @ 14:04)  HR: 59 (01-25-25 @ 14:04) (59 - 68)  BP: 131/78 (01-25-25 @ 14:04) (131/78 - 159/92)  RR:  (18 - 18)  SpO2:  (97% - 97%)  Wt(kg): --  General: AAO x 3, appropriate mood and affect    Ophthalmology Exam:  Visual acuity (cc): 20/20 OD, 20/25 OS  Pupils: PERRL OU, trace rAPD OS  Ttono: 10, 8  Extraocular movements (EOMs): Full OU, no pain, no diplopia  Confrontational Visual Field (CVF): Full OD, inferior hemidefect OS  Color Plates: 12/12 OU    Pen Light Exam (PLE)  External: Flat OU  Lids/Lashes/Lacrimal Ducts: Flat OU    Sclera/Conjunctiva: W+Q OU  Cornea: Cl OU  Anterior Chamber: D+F OU    Iris: Flat OU  Lens: Cl OU    Fundus Exam: dilated with 1% tropicamide and 2.5% phenylephrine  Approval obtained from primary team for dilation  Patient aware that pupils can remained dilated for at least 4-6 hours  Exam performed with 20D lens    Vitreous: wnl OU  Disc, cup/disc: sharp and pink, 0.4 OU  Macula: wnl OU  Vessels: wnl OU  Periphery: wnl OU    Labs/Imaging:  ***   [facility] DEPARTMENT OF OPHTHALMOLOGY - INITIAL ADULT CONSULT  -----------------------------------------------------------------------------------------------------------  Brandee Watson MD  -----------------------------------------------------------------------------------------------------------    HPI: 60M PMH HLD sent in from optometrist office Dr. Elo Lerma OD for optic disc edema OS. Pt reporting waking up with blurry vision in his inferior visual field left eye 3 days ago. Denies any inciting events. Reports he used to snore in the past but wife has not mentioned snoring recently. No new medications. Not on Viagra. Visual field defect was constant also associated with flashes and floaters. No curtain falling. Denies pain. Denies headaches, scalp tenderness, jaw claudication. Went to optometrist office today, undilated exam significant for optic disc edema OS - sent to ED for further workup. Last PCP visit 12/2024. Last eye exam 2 years ago - unremarkable exam. Overall feels well, no recent illnesses, sick contacts.      PAST MEDICAL & SURGICAL HISTORY:  No pertinent past medical history        Past Ocular History: denies surg/laser  Ophthalmic Medications: none  FAMILY HISTORY:   no glc/amd  Social History: no etoh/tobacco    MEDICATIONS  (STANDING):    MEDICATIONS  (PRN):    Allergies & Intolerances:   No Known Allergies    Review of Systems:  Constitutional: No fever, chills  Eyes: +blurry vision OS, +flashes, +floaters OS. No FBS, erythema, discharge, double vision, OU  Neuro: No tremors  Cardiovascular: No chest pain, palpitations  Respiratory: No SOB, no cough  GI: No nausea, vomiting, abdominal pain  : No dysuria  Skin: no rash  Psych: no depression  Endocrine: no polyuria, polydipsia  Heme/lymph: no swelling    VITALS: T(C): 36.4 (01-25-25 @ 14:04)  T(F): 97.5 (01-25-25 @ 14:04), Max: 97.5 (01-25-25 @ 14:04)  HR: 59 (01-25-25 @ 14:04) (59 - 68)  BP: 131/78 (01-25-25 @ 14:04) (131/78 - 159/92)  RR:  (18 - 18)  SpO2:  (97% - 97%)  Wt(kg): --  General: AAO x 3, appropriate mood and affect    Ophthalmology Exam:  Visual acuity (cc): 20/20 OD, 20/25 OS  Pupils: PERRL OU, 1+ rAPD OS  Ttono: 10, 8  Extraocular movements (EOMs): Full OU, no pain, no diplopia  Confrontational Visual Field (CVF): Full OD, inferior hemidefect OS  Color Plates: 12/12 OU    Pen Light Exam (PLE)  External: Flat OU  Lids/Lashes/Lacrimal Ducts: Flat OU    Sclera/Conjunctiva: W+Q OU  Cornea: Cl OU  Anterior Chamber: D+F OU    Iris: Flat OU  Lens: Cl OU    Fundus Exam: dilated with 1% tropicamide and 2.5% phenylephrine  Approval obtained from primary team for dilation  Patient aware that pupils can remained dilated for at least 4-6 hours  Exam performed with 20D lens    Vitreous: wnl OU  Disc, cup/disc: sharp and pink 0.2 OD; 360 hyperemic optic disc edema most prominent superiorly OS  Macula: wnl OU  Vessels: wnl OU  Periphery: wnl OU

## 2025-01-26 LAB
A1C WITH ESTIMATED AVERAGE GLUCOSE RESULT: 5.4 % — SIGNIFICANT CHANGE UP (ref 4–5.6)
ALBUMIN SERPL ELPH-MCNC: 4.6 G/DL — SIGNIFICANT CHANGE UP (ref 3.3–5)
ALP SERPL-CCNC: 85 U/L — SIGNIFICANT CHANGE UP (ref 40–120)
ALT FLD-CCNC: 23 U/L — SIGNIFICANT CHANGE UP (ref 10–45)
ANION GAP SERPL CALC-SCNC: 14 MMOL/L — SIGNIFICANT CHANGE UP (ref 5–17)
ANTI-RIBONUCLEAR PROTEIN: <0.2 AI — SIGNIFICANT CHANGE UP
AST SERPL-CCNC: 26 U/L — SIGNIFICANT CHANGE UP (ref 10–40)
BASOPHILS # BLD AUTO: 0.03 K/UL — SIGNIFICANT CHANGE UP (ref 0–0.2)
BASOPHILS NFR BLD AUTO: 0.4 % — SIGNIFICANT CHANGE UP (ref 0–2)
BILIRUB SERPL-MCNC: 1.1 MG/DL — SIGNIFICANT CHANGE UP (ref 0.2–1.2)
BUN SERPL-MCNC: 13 MG/DL — SIGNIFICANT CHANGE UP (ref 7–23)
C3 SERPL-MCNC: 119 MG/DL — SIGNIFICANT CHANGE UP (ref 81–157)
C4 SERPL-MCNC: 20 MG/DL — SIGNIFICANT CHANGE UP (ref 13–39)
CALCIUM SERPL-MCNC: 9.5 MG/DL — SIGNIFICANT CHANGE UP (ref 8.4–10.5)
CHLORIDE SERPL-SCNC: 95 MMOL/L — LOW (ref 96–108)
CO2 SERPL-SCNC: 23 MMOL/L — SIGNIFICANT CHANGE UP (ref 22–31)
CREAT SERPL-MCNC: 0.94 MG/DL — SIGNIFICANT CHANGE UP (ref 0.5–1.3)
EGFR: 94 ML/MIN/1.73M2 — SIGNIFICANT CHANGE UP
ENA SM AB FLD QL: <0.2 AI — SIGNIFICANT CHANGE UP
EOSINOPHIL # BLD AUTO: 0.01 K/UL — SIGNIFICANT CHANGE UP (ref 0–0.5)
EOSINOPHIL NFR BLD AUTO: 0.1 % — SIGNIFICANT CHANGE UP (ref 0–6)
ESTIMATED AVERAGE GLUCOSE: 108 MG/DL — SIGNIFICANT CHANGE UP (ref 68–114)
FOLATE SERPL-MCNC: 11.8 NG/ML — SIGNIFICANT CHANGE UP
GLUCOSE SERPL-MCNC: 126 MG/DL — HIGH (ref 70–99)
HAV IGM SER-ACNC: SIGNIFICANT CHANGE UP
HBV CORE IGM SER-ACNC: SIGNIFICANT CHANGE UP
HBV SURFACE AG SER-ACNC: SIGNIFICANT CHANGE UP
HCT VFR BLD CALC: 47 % — SIGNIFICANT CHANGE UP (ref 39–50)
HCV AB S/CO SERPL IA: 0.05 S/CO — SIGNIFICANT CHANGE UP
HCV AB SERPL-IMP: SIGNIFICANT CHANGE UP
HCYS SERPL-MCNC: 9.2 UMOL/L — SIGNIFICANT CHANGE UP
HGB BLD-MCNC: 16.5 G/DL — SIGNIFICANT CHANGE UP (ref 13–17)
HIV 1+2 AB+HIV1 P24 AG SERPL QL IA: SIGNIFICANT CHANGE UP
IMM GRANULOCYTES NFR BLD AUTO: 0.4 % — SIGNIFICANT CHANGE UP (ref 0–0.9)
LYMPHOCYTES # BLD AUTO: 0.91 K/UL — LOW (ref 1–3.3)
LYMPHOCYTES # BLD AUTO: 12 % — LOW (ref 13–44)
MAGNESIUM SERPL-MCNC: 2 MG/DL — SIGNIFICANT CHANGE UP (ref 1.6–2.6)
MCHC RBC-ENTMCNC: 31.2 PG — SIGNIFICANT CHANGE UP (ref 27–34)
MCHC RBC-ENTMCNC: 35.1 G/DL — SIGNIFICANT CHANGE UP (ref 32–36)
MCV RBC AUTO: 88.8 FL — SIGNIFICANT CHANGE UP (ref 80–100)
MONOCYTES # BLD AUTO: 0.09 K/UL — SIGNIFICANT CHANGE UP (ref 0–0.9)
MONOCYTES NFR BLD AUTO: 1.2 % — LOW (ref 2–14)
NEUTROPHILS # BLD AUTO: 6.5 K/UL — SIGNIFICANT CHANGE UP (ref 1.8–7.4)
NEUTROPHILS NFR BLD AUTO: 85.9 % — HIGH (ref 43–77)
NRBC # BLD: 0 /100 WBCS — SIGNIFICANT CHANGE UP (ref 0–0)
NRBC BLD-RTO: 0 /100 WBCS — SIGNIFICANT CHANGE UP (ref 0–0)
PHOSPHATE SERPL-MCNC: 2.1 MG/DL — LOW (ref 2.5–4.5)
PLATELET # BLD AUTO: 170 K/UL — SIGNIFICANT CHANGE UP (ref 150–400)
POTASSIUM SERPL-MCNC: 4 MMOL/L — SIGNIFICANT CHANGE UP (ref 3.5–5.3)
POTASSIUM SERPL-SCNC: 4 MMOL/L — SIGNIFICANT CHANGE UP (ref 3.5–5.3)
PROT SERPL-MCNC: 7.7 G/DL — SIGNIFICANT CHANGE UP (ref 6–8.3)
RBC # BLD: 5.29 M/UL — SIGNIFICANT CHANGE UP (ref 4.2–5.8)
RBC # FLD: 13.2 % — SIGNIFICANT CHANGE UP (ref 10.3–14.5)
SODIUM SERPL-SCNC: 132 MMOL/L — LOW (ref 135–145)
TSH SERPL-MCNC: 1.15 UIU/ML — SIGNIFICANT CHANGE UP (ref 0.27–4.2)
VIT B12 SERPL-MCNC: 500 PG/ML — SIGNIFICANT CHANGE UP (ref 232–1245)
WBC # BLD: 7.57 K/UL — SIGNIFICANT CHANGE UP (ref 3.8–10.5)
WBC # FLD AUTO: 7.57 K/UL — SIGNIFICANT CHANGE UP (ref 3.8–10.5)

## 2025-01-26 PROCEDURE — 99223 1ST HOSP IP/OBS HIGH 75: CPT

## 2025-01-26 PROCEDURE — 72156 MRI NECK SPINE W/O & W/DYE: CPT | Mod: 26

## 2025-01-26 PROCEDURE — 72157 MRI CHEST SPINE W/O & W/DYE: CPT | Mod: 26

## 2025-01-26 RX ORDER — ENOXAPARIN SODIUM 100 MG/ML
40 INJECTION SUBCUTANEOUS EVERY 24 HOURS
Refills: 0 | Status: DISCONTINUED | OUTPATIENT
Start: 2025-01-26 | End: 2025-01-27

## 2025-01-26 RX ORDER — METHYLPREDNISOLONE ACETATE 40 MG/ML
1000 VIAL (ML) INJECTION DAILY
Refills: 0 | Status: DISCONTINUED | OUTPATIENT
Start: 2025-01-27 | End: 2025-01-27

## 2025-01-26 RX ORDER — GLUCAGON 3 MG/1
1 POWDER NASAL ONCE
Refills: 0 | Status: DISCONTINUED | OUTPATIENT
Start: 2025-01-26 | End: 2025-01-27

## 2025-01-26 RX ORDER — ATORVASTATIN CALCIUM 80 MG/1
80 TABLET, FILM COATED ORAL AT BEDTIME
Refills: 0 | Status: DISCONTINUED | OUTPATIENT
Start: 2025-01-26 | End: 2025-01-27

## 2025-01-26 RX ORDER — PANTOPRAZOLE 20 MG/1
40 TABLET, DELAYED RELEASE ORAL
Refills: 0 | Status: DISCONTINUED | OUTPATIENT
Start: 2025-01-26 | End: 2025-01-27

## 2025-01-26 RX ORDER — SODIUM CHLORIDE 9 G/ML
1000 INJECTION, SOLUTION INTRAVENOUS
Refills: 0 | Status: DISCONTINUED | OUTPATIENT
Start: 2025-01-26 | End: 2025-01-27

## 2025-01-26 RX ORDER — SOD PHOSPHATE,MONOBASIC-DIBAS 3MMOL/ML
15 VIAL (ML) INTRAVENOUS ONCE
Refills: 0 | Status: COMPLETED | OUTPATIENT
Start: 2025-01-26 | End: 2025-01-26

## 2025-01-26 RX ORDER — DM/PSEUDOEPHED/ACETAMINOPHEN 10-30-250
15 CAPSULE ORAL ONCE
Refills: 0 | Status: DISCONTINUED | OUTPATIENT
Start: 2025-01-26 | End: 2025-01-27

## 2025-01-26 RX ORDER — METHYLPREDNISOLONE ACETATE 40 MG/ML
250 VIAL (ML) INJECTION EVERY 6 HOURS
Refills: 0 | Status: DISCONTINUED | OUTPATIENT
Start: 2025-01-26 | End: 2025-01-26

## 2025-01-26 RX ORDER — DM/PSEUDOEPHED/ACETAMINOPHEN 10-30-250
25 CAPSULE ORAL ONCE
Refills: 0 | Status: DISCONTINUED | OUTPATIENT
Start: 2025-01-26 | End: 2025-01-27

## 2025-01-26 RX ORDER — INSULIN LISPRO 100/ML
VIAL (ML) SUBCUTANEOUS
Refills: 0 | Status: DISCONTINUED | OUTPATIENT
Start: 2025-01-26 | End: 2025-01-27

## 2025-01-26 RX ORDER — METHYLPREDNISOLONE ACETATE 40 MG/ML
750 VIAL (ML) INJECTION ONCE
Refills: 0 | Status: COMPLETED | OUTPATIENT
Start: 2025-01-26 | End: 2025-01-26

## 2025-01-26 RX ORDER — ASPIRIN 81 MG/1
81 TABLET, COATED ORAL DAILY
Refills: 0 | Status: DISCONTINUED | OUTPATIENT
Start: 2025-01-26 | End: 2025-01-27

## 2025-01-26 RX ORDER — DM/PSEUDOEPHED/ACETAMINOPHEN 10-30-250
12.5 CAPSULE ORAL ONCE
Refills: 0 | Status: DISCONTINUED | OUTPATIENT
Start: 2025-01-26 | End: 2025-01-27

## 2025-01-26 RX ADMIN — Medication 100 MILLIGRAM(S): at 07:11

## 2025-01-26 RX ADMIN — Medication 2: at 22:42

## 2025-01-26 RX ADMIN — ATORVASTATIN CALCIUM 80 MILLIGRAM(S): 80 TABLET, FILM COATED ORAL at 22:25

## 2025-01-26 RX ADMIN — Medication 62.5 MILLIMOLE(S): at 17:24

## 2025-01-26 RX ADMIN — ENOXAPARIN SODIUM 40 MILLIGRAM(S): 100 INJECTION SUBCUTANEOUS at 07:11

## 2025-01-26 RX ADMIN — Medication 2: at 17:54

## 2025-01-26 RX ADMIN — ASPIRIN 81 MILLIGRAM(S): 81 TABLET, COATED ORAL at 12:42

## 2025-01-26 RX ADMIN — Medication 100 MILLIGRAM(S): at 01:28

## 2025-01-26 RX ADMIN — Medication 2: at 09:18

## 2025-01-26 RX ADMIN — Medication 2: at 12:46

## 2025-01-26 RX ADMIN — PANTOPRAZOLE 40 MILLIGRAM(S): 20 TABLET, DELAYED RELEASE ORAL at 07:11

## 2025-01-26 RX ADMIN — Medication 100 MILLIGRAM(S): at 12:42

## 2025-01-26 NOTE — CONSULT NOTE ADULT - SUBJECTIVE AND OBJECTIVE BOX
Patient is a 60y old  Male who presents with a chief complaint of Vision change (26 Jan 2025 00:26)      INTERVAL HPI/OVERNIGHT EVENTS:  ICU Vital Signs Last 24 Hrs  T(C): 36.3 (26 Jan 2025 06:35), Max: 36.9 (25 Jan 2025 22:00)  T(F): 97.3 (26 Jan 2025 06:35), Max: 98.5 (25 Jan 2025 22:00)  HR: 65 (26 Jan 2025 06:35) (55 - 65)  BP: 148/78 (26 Jan 2025 06:35) (131/78 - 154/87)  BP(mean): --  ABP: --  ABP(mean): --  RR: 18 (26 Jan 2025 06:35) (18 - 18)  SpO2: 95% (26 Jan 2025 06:35) (95% - 97%)    O2 Parameters below as of 26 Jan 2025 06:35  Patient On (Oxygen Delivery Method): room air          I&O's Summary        LABS:                        16.5   7.57  )-----------( 170      ( 26 Jan 2025 05:30 )             47.0     01-26    132[L]  |  95[L]  |  13  ----------------------------<  126[H]  4.0   |  23  |  0.94    Ca    9.5      26 Jan 2025 05:30  Phos  2.1     01-26  Mg     2.0     01-26    TPro  7.7  /  Alb  4.6  /  TBili  1.1  /  DBili  x   /  AST  26  /  ALT  23  /  AlkPhos  85  01-26    PT/INR - ( 25 Jan 2025 23:38 )   PT: 11.2 sec;   INR: 0.96          PTT - ( 25 Jan 2025 23:38 )  PTT:34.2 sec  Urinalysis Basic - ( 26 Jan 2025 05:30 )    Color: x / Appearance: x / SG: x / pH: x  Gluc: 126 mg/dL / Ketone: x  / Bili: x / Urobili: x   Blood: x / Protein: x / Nitrite: x   Leuk Esterase: x / RBC: x / WBC x   Sq Epi: x / Non Sq Epi: x / Bacteria: x      CAPILLARY BLOOD GLUCOSE      POCT Blood Glucose.: 162 mg/dL (26 Jan 2025 09:03)        RADIOLOGY & ADDITIONAL TESTS:    Consultant(s) Notes Reviewed:  [x ] YES  [ ] NO    MEDICATIONS  (STANDING):  aspirin enteric coated 81 milliGRAM(s) Oral daily  atorvastatin 80 milliGRAM(s) Oral at bedtime  dextrose 5%. 1000 milliLiter(s) (100 mL/Hr) IV Continuous <Continuous>  dextrose 5%. 1000 milliLiter(s) (50 mL/Hr) IV Continuous <Continuous>  dextrose 50% Injectable 25 Gram(s) IV Push once  dextrose 50% Injectable 12.5 Gram(s) IV Push once  dextrose 50% Injectable 25 Gram(s) IV Push once  enoxaparin Injectable 40 milliGRAM(s) SubCutaneous every 24 hours  glucagon  Injectable 1 milliGRAM(s) IntraMuscular once  influenza   Vaccine 0.5 milliLiter(s) IntraMuscular once  insulin lispro (ADMELOG) corrective regimen sliding scale   SubCutaneous Before meals and at bedtime  methylPREDNISolone sodium succinate IVPB 750 milliGRAM(s) IV Intermittent once  pantoprazole    Tablet 40 milliGRAM(s) Oral before breakfast  sodium phosphate 15 milliMole(s)/250 mL IVPB 15 milliMole(s) IV Intermittent once    MEDICATIONS  (PRN):  dextrose Oral Gel 15 Gram(s) Oral once PRN Blood Glucose LESS THAN 70 milliGRAM(s)/deciliter      PHYSICAL EXAM:  GENERAL: well built, well nourished  HEAD:  Atraumatic, Normocephalic  EYES: EOMI, PERRLA, conjunctiva and sclera clear  ENT: No tonsillar erythema, exudates, or enlargement; Moist mucous membranes, Good dentition, No lesions  NECK: Supple, No JVD, Normal thyroid, no enlarged nodes  NERVOUS SYSTEM:  Alert & Oriented X3, Good concentration; Motor Strength 5/5 B/L upper and lower extremities; DTRs 2+ intact and symmetric, sensory intact  CHEST/LUNG: B/L good air entry; No rales, rhonchi, or wheezing  HEART: S1S2 normal, no S3, Regular rate and rhythm; No murmurs, rubs, or gallops  ABDOMEN: Soft, Nontender, Nondistended; Bowel sounds present  EXTREMITIES:  2+ Peripheral Pulses, No clubbing, cyanosis, or edema  LYMPH: No lymphadenopathy noted  SKIN: No rashes or lesions    Care Discussed with Consultants/Other Providers [ x] YES  [ ] NO Patient is a 60y old  Male who presents with a chief complaint of Vision change (26 Jan 2025 00:26)      INTERVAL HPI  Pt seen at bedside. appears well. in NAD. states his L eye vission is blurry. denies any other symptoms. no headache, CP, SOB, abd pain, NV.     Vital Signs Last 24 Hrs  T(C): 36.3 (26 Jan 2025 06:35), Max: 36.9 (25 Jan 2025 22:00)  T(F): 97.3 (26 Jan 2025 06:35), Max: 98.5 (25 Jan 2025 22:00)  HR: 65 (26 Jan 2025 06:35) (55 - 65)  BP: 148/78 (26 Jan 2025 06:35) (131/78 - 154/87)  BP(mean): --  ABP: --  ABP(mean): --  RR: 18 (26 Jan 2025 06:35) (18 - 18)  SpO2: 95% (26 Jan 2025 06:35) (95% - 97%)    O2 Parameters below as of 26 Jan 2025 06:35  Patient On (Oxygen Delivery Method): room air          I&O's Summary        LABS:                        16.5   7.57  )-----------( 170      ( 26 Jan 2025 05:30 )             47.0     01-26    132[L]  |  95[L]  |  13  ----------------------------<  126[H]  4.0   |  23  |  0.94    Ca    9.5      26 Jan 2025 05:30  Phos  2.1     01-26  Mg     2.0     01-26    TPro  7.7  /  Alb  4.6  /  TBili  1.1  /  DBili  x   /  AST  26  /  ALT  23  /  AlkPhos  85  01-26    PT/INR - ( 25 Jan 2025 23:38 )   PT: 11.2 sec;   INR: 0.96          PTT - ( 25 Jan 2025 23:38 )  PTT:34.2 sec  Urinalysis Basic - ( 26 Jan 2025 05:30 )    Color: x / Appearance: x / SG: x / pH: x  Gluc: 126 mg/dL / Ketone: x  / Bili: x / Urobili: x   Blood: x / Protein: x / Nitrite: x   Leuk Esterase: x / RBC: x / WBC x   Sq Epi: x / Non Sq Epi: x / Bacteria: x      CAPILLARY BLOOD GLUCOSE      POCT Blood Glucose.: 162 mg/dL (26 Jan 2025 09:03)        RADIOLOGY & ADDITIONAL TESTS:    Consultant(s) Notes Reviewed:  [x ] YES  [ ] NO    MEDICATIONS  (STANDING):  aspirin enteric coated 81 milliGRAM(s) Oral daily  atorvastatin 80 milliGRAM(s) Oral at bedtime  dextrose 5%. 1000 milliLiter(s) (100 mL/Hr) IV Continuous <Continuous>  dextrose 5%. 1000 milliLiter(s) (50 mL/Hr) IV Continuous <Continuous>  dextrose 50% Injectable 25 Gram(s) IV Push once  dextrose 50% Injectable 12.5 Gram(s) IV Push once  dextrose 50% Injectable 25 Gram(s) IV Push once  enoxaparin Injectable 40 milliGRAM(s) SubCutaneous every 24 hours  glucagon  Injectable 1 milliGRAM(s) IntraMuscular once  influenza   Vaccine 0.5 milliLiter(s) IntraMuscular once  insulin lispro (ADMELOG) corrective regimen sliding scale   SubCutaneous Before meals and at bedtime  methylPREDNISolone sodium succinate IVPB 750 milliGRAM(s) IV Intermittent once  pantoprazole    Tablet 40 milliGRAM(s) Oral before breakfast  sodium phosphate 15 milliMole(s)/250 mL IVPB 15 milliMole(s) IV Intermittent once    MEDICATIONS  (PRN):  dextrose Oral Gel 15 Gram(s) Oral once PRN Blood Glucose LESS THAN 70 milliGRAM(s)/deciliter      PHYSICAL EXAM:  GENERAL: well built, well nourished  HEAD:  Atraumatic, Normocephalic  EYES: EOMI, PERRLA, conjunctiva and sclera clear  NERVOUS SYSTEM:  Alert & Oriented X3, Good concentration; Motor Strength 5/5 B/L upper and lower extremities; sensory intact  CHEST/LUNG: B/L good air entry; No rales, rhonchi, or wheezing  HEART: S1S2 normal, no S3, Regular rate and rhythm; No murmurs, rubs, or gallops  ABDOMEN: Soft, Nontender, Nondistended; Bowel sounds present  EXTREMITIES:  2+ Peripheral Pulses, No clubbing, cyanosis, or edema      Care Discussed with Consultants/Other Providers [ x] YES  [ ] NO

## 2025-01-26 NOTE — CONSULT NOTE ADULT - ASSESSMENT
60M PMHx of HLD sent by optometrist for left optic disc edema, 3 days of left eye blurry vision, flashes/floaters. CTH/CTA negative for acute stroke. MRI with papilledema and hyperintensity of intraorbital optic nerve, concerning for optic neuritis vs non-arteritic anterior ischemic optic neuropathy (NAION)    #Blurry vision: likely Optic neuritis vs NAION  -c/w steroids per neuro   -ISS, PPI  -plan for LP  management per primary     #HLD  -cw atorvastatin 40mg QD    #Hyponatremia: corrected 133. likely hypovolemic. asymptomatic   -f/up urine lytes   -encourage PO intake   -trend BMP. consider fluid bolus if not improved.     #DVT ppx  -c/w lovenox

## 2025-01-26 NOTE — PHYSICAL THERAPY INITIAL EVALUATION ADULT - PERTINENT HX OF CURRENT PROBLEM, REHAB EVAL
Pt. is a 60 y.o male p/w 3 days of L eye vision changes with lower field darkening and associated flashes and floaters. Pt. denies trauma/injury. Pt. has been admitted for further neuro w/u/ concern for optic neuritis.

## 2025-01-26 NOTE — H&P ADULT - ATTENDING COMMENTS
60-year-old man with history of hyperlipidemia presenting with monocular right eye visual disturbance with inferior altitudinal visual deficit and optic disc edema with intraorbital T2 signal change on MRI.  Admitted for ischemic or inflammatory optic neuropathy, started on aspirin statin and pulsed dose steroids.  Pending MRI cervical and thoracic spine with and without gadolinium and lumbar puncture. 60-year-old man with history of hyperlipidemia presenting with monocular right eye visual disturbance with inferior altitudinal visual deficit and optic disc edema with intraorbital T2 FLAIR signal change on MRI with prominent optic nerve sheath edema.  No white matter brain lesions to suggest multiple sclerosis. Admitted for ischemic or inflammatory optic neuropathy, started on aspirin statin and pulsed dose steroids.  Pending MRI cervical and thoracic spine with and without gadolinium and lumbar puncture. Leading diagnostic impression at this time is not nonarteritic ischemic optic neuropathy however cannot definitively rule out inflammatory cause

## 2025-01-26 NOTE — PHYSICAL THERAPY INITIAL EVALUATION ADULT - MODALITIES TREATMENT COMMENTS
No cranial nerve deficits except decreased vision in L eye in inferior field with nasal quadrant being more affected than temporal. No associated coordination or balance deficits. Speech fluent.

## 2025-01-26 NOTE — H&P ADULT - ATTENDING SUPERVISION STATEMENT
Resident

## 2025-01-26 NOTE — H&P ADULT - HISTORY OF PRESENT ILLNESS
Patient is a 59yo man with PMHx of HLD sent to the ED by optometrist for 3 days of left eye blurry vision associated with flashes and floaters, found to have left eye disc edema. Initially patient noticed blurry vision, but later he sees only black in the inferior visual fields, temporal quadrant darker than nasal. There is also fluctuation of vision with some improvement during the day. Patient denies any pain with eye movement, double vision, headache, jaw pain, weakness/numbness, urinary/bowel change, slurred speech, memory deficit, chest discomfort, palpitation, or shortness of breath. He denies recent trauma, head injury, new medication, sick contact.. He does not take any medications although PCP recommended statin for HLD.

## 2025-01-26 NOTE — H&P ADULT - ASSESSMENT
60M PMHx of HLD sent by optometrist for left optic disc edema, 3 days of left eye blurry vision, flashes/floaters. CTH/CTA negative for acute stroke. MRI with papilledema and hyperintensity of intraorbital optic nerve, concerning for optic neuritis vs non-arteritic anterior ischemic optic neuropathy (NAION), which prompts steroid treatment and further workup including MR spine and LP.    Plan:    #Blurred vision with inferior hemidefect and rAPD, likely Optic neuritis vs NAION  - obtain labs (MOG serum, NMO serum, Coag, VZV, Hep B, Hep C, HIV, Syphilis, JAVAN virus (stratify antibody), Quantiferon gold, MICHELLE, JACKIE, dsDNA, C3, C4, Encephalitis panel, Neurofilament, Immunofixation, TSH, B12, homocysteine, MMA, anticardiolipin)  - obtain MR cervical and thoracic spine w/ and w/o IV contrast  - start IV Methylprednisolone 250mg QID, may change to 1g qD  - c/w aspirin 81 qD and atorvastatin 80mg qHS  - plan for lumbar puncture  - start Protonix and mISS while on steroids  - follow-up with neuro-ophthalmology outpatient after discharge    #HLD   on admission  - c/w atorvastatin 80mg qHS    #MISC  E: replete K<4, Mg<2  N: regular  VTE Prophylaxis: Lovenox  GI: PPI  Activity: AAT  C: Full Code  D: Floor

## 2025-01-26 NOTE — H&P ADULT - NSHPLABSRESULTS_GEN_ALL_CORE
LABS:                         14.9   3.33  )-----------( 169      ( 25 Jan 2025 16:18 )             45.0     01-25    139  |  104  |  14  ----------------------------<  105[H]  4.3   |  26  |  0.87    Ca    9.2      25 Jan 2025 16:18    TPro  7.0  /  Alb  4.5  /  TBili  0.5  /  DBili  x   /  AST  19  /  ALT  20  /  AlkPhos  77  01-25    PT/INR - ( 25 Jan 2025 23:38 )   PT: 11.2 sec;   INR: 0.96          PTT - ( 25 Jan 2025 23:38 )  PTT:34.2 sec  Urinalysis Basic - ( 25 Jan 2025 16:18 )    Color: x / Appearance: x / SG: x / pH: x  Gluc: 105 mg/dL / Ketone: x  / Bili: x / Urobili: x   Blood: x / Protein: x / Nitrite: x   Leuk Esterase: x / RBC: x / WBC x   Sq Epi: x / Non Sq Epi: x / Bacteria: x          RADIOLOGY, EKG & ADDITIONAL TESTS:   < from: CT Head No Cont (01.25.25 @ 17:23) >    CT HEAD:  No acute intracranial hemorrhage or mass effect.    CTA NECK:  No arterial steno-occlusive disease or evidence of dissection.    CTA HEAD:  No arterial steno-occlusive disease or evidence of aneurysm.    Apparent distention of optic nerve sheaths, MRI is pending for further optic nerve assessment. Query papilledema and correlate with clinical/funduscopic exam.          < from: MR Head w/wo IV Cont (01.25.25 @ 19:22)  Preliminary>    No acute intracranial abnormality. No recent ischemia, hemorrhage, or mass.          < from: MR Orbits w/wo IV Cont (01.25.25 @ 19:22)  Preliminary>    1.  Mildly prominent optic nerve sheath fluid, can be seen with papilledema.  2.   Central STIR hyperintensity within the intraorbital optic nerves may reflect sequela optic neuritis.

## 2025-01-26 NOTE — H&P ADULT - NSHPPHYSICALEXAM_GEN_ALL_CORE
General: Well-developed, well nourished, in no acute distress.  Eyes: Conjunctiva and sclera clear.  Neurologic:  - Mental Status:  Alert, awake, oriented to person, place, and time; Speech is fluent with intact naming, repetition, and comprehension; Good overall fund of knowledge.  - Cranial Nerves II-XII:    II:  Visual fields full OD, inferior visual field cut OS; RAPD; Pupils are equal, round, dilated b/l (from dilation drops after ophthalmology exam).  III, IV, VI:  Extraocular movements are intact without nystagmus.  V:  Facial sensation is intact in the V1-V3 distribution bilaterally.  VII:  Face is symmetric with normal eye closure and smile  VIII:  Hearing is intact to finger rub.  IX, X:  Uvula is midline and soft palate rises symmetrically  XI:  Head turning and shoulder shrug are intact.  XII:  Tongue protrudes in the midline.  - Motor:  Strength is 5/5 throughout. There is no pronator drift.  Normal muscle bulk and tone throughout.  - Reflexes:  2+ and symmetric at the biceps, triceps, brachioradialis, knees, and ankles.  Plantar responses flexor.  - Sensory:  Intact throughout to vibration, and joint-position, light touch, pin prick.  - Coordination:  Finger-nose-finger and heel-knee-shin intact without dysmetria.  Rapid alternating hand movements intact.  - Gait: Narrow, steady. Romberg testing is negative.

## 2025-01-26 NOTE — PHYSICAL THERAPY INITIAL EVALUATION ADULT - PREDICTED DURATION OF THERAPY (DAYS/WKS), PT EVAL
Pt. remains fully functionally independent and does not require further in-patient PT f/u at this point.

## 2025-01-27 ENCOUNTER — TRANSCRIPTION ENCOUNTER (OUTPATIENT)
Age: 60
End: 2025-01-27

## 2025-01-27 VITALS
DIASTOLIC BLOOD PRESSURE: 56 MMHG | SYSTOLIC BLOOD PRESSURE: 123 MMHG | OXYGEN SATURATION: 91 % | TEMPERATURE: 98 F | HEART RATE: 69 BPM | RESPIRATION RATE: 18 BRPM

## 2025-01-27 DIAGNOSIS — E87.1 HYPO-OSMOLALITY AND HYPONATREMIA: ICD-10-CM

## 2025-01-27 DIAGNOSIS — H53.8 OTHER VISUAL DISTURBANCES: ICD-10-CM

## 2025-01-27 DIAGNOSIS — E78.5 HYPERLIPIDEMIA, UNSPECIFIED: ICD-10-CM

## 2025-01-27 LAB
B2 GLYCOPROT1 IGA SER QL: <2 U/ML — SIGNIFICANT CHANGE UP
B2 GLYCOPROT1 IGG SER-ACNC: <1.4 U/ML — SIGNIFICANT CHANGE UP
B2 GLYCOPROT1 IGM SER-ACNC: 19.8 U/ML — SIGNIFICANT CHANGE UP
CARDIOLIPIN IGM SER-MCNC: 23.7 MPL U/ML — HIGH
CARDIOLIPIN IGM SER-MCNC: <1.6 GPL U/ML — SIGNIFICANT CHANGE UP
DEPRECATED CARDIOLIPIN IGA SER: 2.2 APL U/ML — SIGNIFICANT CHANGE UP
DRVVT RATIO: 0.9 RATIO — SIGNIFICANT CHANGE UP (ref 0–1.03)
DRVVT SCREEN TO CONFIRM RATIO: SIGNIFICANT CHANGE UP
JCPYV DNA SPEC QL NAA+PROBE: SIGNIFICANT CHANGE UP COPIES/ML
T PALLIDUM AB TITR SER: NEGATIVE — SIGNIFICANT CHANGE UP

## 2025-01-27 PROCEDURE — 86038 ANTINUCLEAR ANTIBODIES: CPT

## 2025-01-27 PROCEDURE — 99285 EMERGENCY DEPT VISIT HI MDM: CPT

## 2025-01-27 PROCEDURE — 87798 DETECT AGENT NOS DNA AMP: CPT

## 2025-01-27 PROCEDURE — 85598 HEXAGNAL PHOSPH PLTLT NEUTRL: CPT

## 2025-01-27 PROCEDURE — 97161 PT EVAL LOW COMPLEX 20 MIN: CPT

## 2025-01-27 PROCEDURE — 86140 C-REACTIVE PROTEIN: CPT

## 2025-01-27 PROCEDURE — 86147 CARDIOLIPIN ANTIBODY EA IG: CPT

## 2025-01-27 PROCEDURE — 70498 CT ANGIOGRAPHY NECK: CPT | Mod: MC

## 2025-01-27 PROCEDURE — 84100 ASSAY OF PHOSPHORUS: CPT

## 2025-01-27 PROCEDURE — 70450 CT HEAD/BRAIN W/O DYE: CPT | Mod: MC

## 2025-01-27 PROCEDURE — 80061 LIPID PANEL: CPT

## 2025-01-27 PROCEDURE — 85306 CLOT INHIBIT PROT S FREE: CPT

## 2025-01-27 PROCEDURE — A9585: CPT

## 2025-01-27 PROCEDURE — 85613 RUSSELL VIPER VENOM DILUTED: CPT

## 2025-01-27 PROCEDURE — 70543 MRI ORBT/FAC/NCK W/O &W/DYE: CPT | Mod: MC

## 2025-01-27 PROCEDURE — 86053 AQAPRN-4 ANTB FLO CYTMTRY EA: CPT

## 2025-01-27 PROCEDURE — 70496 CT ANGIOGRAPHY HEAD: CPT | Mod: MC

## 2025-01-27 PROCEDURE — 36415 COLL VENOUS BLD VENIPUNCTURE: CPT

## 2025-01-27 PROCEDURE — 82607 VITAMIN B-12: CPT

## 2025-01-27 PROCEDURE — 83036 HEMOGLOBIN GLYCOSYLATED A1C: CPT

## 2025-01-27 PROCEDURE — 86036 ANCA SCREEN EACH ANTIBODY: CPT

## 2025-01-27 PROCEDURE — 86362 MOG-IGG1 ANTB CBA EACH: CPT

## 2025-01-27 PROCEDURE — 85025 COMPLETE CBC W/AUTO DIFF WBC: CPT

## 2025-01-27 PROCEDURE — 85730 THROMBOPLASTIN TIME PARTIAL: CPT

## 2025-01-27 PROCEDURE — 87389 HIV-1 AG W/HIV-1&-2 AB AG IA: CPT

## 2025-01-27 PROCEDURE — 83884 ASSAY NEURFLMNT LIGHT CHAIN: CPT

## 2025-01-27 PROCEDURE — 86146 BETA-2 GLYCOPROTEIN ANTIBODY: CPT

## 2025-01-27 PROCEDURE — 83519 RIA NONANTIBODY: CPT

## 2025-01-27 PROCEDURE — 84443 ASSAY THYROID STIM HORMONE: CPT

## 2025-01-27 PROCEDURE — 82746 ASSAY OF FOLIC ACID SERUM: CPT

## 2025-01-27 PROCEDURE — 86255 FLUORESCENT ANTIBODY SCREEN: CPT

## 2025-01-27 PROCEDURE — 83735 ASSAY OF MAGNESIUM: CPT

## 2025-01-27 PROCEDURE — 85610 PROTHROMBIN TIME: CPT

## 2025-01-27 PROCEDURE — 85303 CLOT INHIBIT PROT C ACTIVITY: CPT

## 2025-01-27 PROCEDURE — 82962 GLUCOSE BLOOD TEST: CPT

## 2025-01-27 PROCEDURE — 85652 RBC SED RATE AUTOMATED: CPT

## 2025-01-27 PROCEDURE — 83090 ASSAY OF HOMOCYSTEINE: CPT

## 2025-01-27 PROCEDURE — 80053 COMPREHEN METABOLIC PANEL: CPT

## 2025-01-27 PROCEDURE — 71045 X-RAY EXAM CHEST 1 VIEW: CPT

## 2025-01-27 PROCEDURE — 86334 IMMUNOFIX E-PHORESIS SERUM: CPT

## 2025-01-27 PROCEDURE — 97166 OT EVAL MOD COMPLEX 45 MIN: CPT

## 2025-01-27 PROCEDURE — 86480 TB TEST CELL IMMUN MEASURE: CPT

## 2025-01-27 PROCEDURE — 70553 MRI BRAIN STEM W/O & W/DYE: CPT | Mod: MC

## 2025-01-27 PROCEDURE — 86341 ISLET CELL ANTIBODY: CPT

## 2025-01-27 PROCEDURE — 86235 NUCLEAR ANTIGEN ANTIBODY: CPT

## 2025-01-27 PROCEDURE — 85307 ASSAY ACTIVATED PROTEIN C: CPT

## 2025-01-27 PROCEDURE — 87799 DETECT AGENT NOS DNA QUANT: CPT

## 2025-01-27 PROCEDURE — 85300 ANTITHROMBIN III ACTIVITY: CPT

## 2025-01-27 PROCEDURE — 86780 TREPONEMA PALLIDUM: CPT

## 2025-01-27 PROCEDURE — 72157 MRI CHEST SPINE W/O & W/DYE: CPT | Mod: MC

## 2025-01-27 PROCEDURE — 80074 ACUTE HEPATITIS PANEL: CPT

## 2025-01-27 PROCEDURE — 86160 COMPLEMENT ANTIGEN: CPT

## 2025-01-27 PROCEDURE — 72156 MRI NECK SPINE W/O & W/DYE: CPT | Mod: MC

## 2025-01-27 PROCEDURE — 99232 SBSQ HOSP IP/OBS MODERATE 35: CPT

## 2025-01-27 RX ORDER — ASPIRIN 81 MG/1
1 TABLET, COATED ORAL
Qty: 90 | Refills: 0
Start: 2025-01-27 | End: 2025-04-26

## 2025-01-27 RX ORDER — ATORVASTATIN CALCIUM 80 MG/1
1 TABLET, FILM COATED ORAL
Qty: 90 | Refills: 0
Start: 2025-01-27 | End: 2025-04-26

## 2025-01-27 RX ADMIN — ENOXAPARIN SODIUM 40 MILLIGRAM(S): 100 INJECTION SUBCUTANEOUS at 06:35

## 2025-01-27 RX ADMIN — Medication 2: at 17:50

## 2025-01-27 RX ADMIN — Medication 4: at 13:10

## 2025-01-27 RX ADMIN — PANTOPRAZOLE 40 MILLIGRAM(S): 20 TABLET, DELAYED RELEASE ORAL at 06:35

## 2025-01-27 RX ADMIN — Medication 2: at 08:37

## 2025-01-27 RX ADMIN — Medication 50 MILLIGRAM(S): at 07:09

## 2025-01-27 RX ADMIN — ASPIRIN 81 MILLIGRAM(S): 81 TABLET, COATED ORAL at 11:28

## 2025-01-27 NOTE — DISCHARGE NOTE PROVIDER - HOSPITAL COURSE
60M PMHx of HLD sent by optometrist for left optic disc edema, 3 days of left eye blurry vision, flashes/floaters. CTH/CTA negative for acute stroke. MRI with  prominent optic nerve sheath fluid c/f papilledema and STIR hyperintensity within the intraorbital optic nerves possibly reflecting sequela optic neuritis. As such, he was empirically started on steroids for c/f optic neuritis. MRI brain C and T spine w/ and w/o contrast were obtained which did not show any demyelination. Ophtho eval was more suggestion of NAION. Thus LP was not pursued and steroids were discontinued.      #Blurred vision likely NAION.  - c/w aspirin 81 qD and atorvastatin 80mg qHS  - Pt requesting TTE be done on an outpatient basis.  - follow-up with neuro-ophthalmology and stroke outpatient  - . TSH 1.15. A1c 5.4    Exam on discharge:  Physical Exam: General: Well-developed, well nourished, in no acute distress.  Eyes: Conjunctiva and sclera clear.  Neurologic:  - Mental Status:  Alert, awake, oriented to person, place, and time; Speech is fluent with intact naming, repetition, and comprehension; Good overall fund of knowledge.  - Cranial Nerves II-XII:    II:  Visual fields full OD, inferior nasal visual field cut OS; RAPD; Pupils are equal, round and reactive  III, IV, VI:  Extraocular movements are intact without nystagmus.  V:  Facial sensation is intact in the V1-V3 distribution bilaterally.  VII:  Face is symmetric with normal eye closure and smile  VIII:  Hearing is intact to finger rub.  IX, X:  Uvula is midline and soft palate rises symmetrically  XI:  Head turning and shoulder shrug are intact.  XII:  Tongue protrudes in the midline.  - Motor:  Strength is 5/5 throughout. There is no pronator drift.  Normal muscle bulk and tone throughout.  - Reflexes:  2+ and symmetric at the biceps, triceps, brachioradialis, knees, and ankles.  Plantar responses flexor.  - Sensory:  Intact throughout to vibration, and joint-position, light touch, pin prick.  - Coordination:  Finger-nose-finger and heel-knee-shin intact without dysmetria.  Rapid alternating hand movements intact.  - Gait: Narrow, steady. Romberg testing is negative. 60M PMHx of HLD sent by optometrist for left optic disc edema, 3 days of left eye blurry vision, flashes/floaters. CTH/CTA negative for acute stroke. MRI with  prominent optic nerve sheath fluid c/f papilledema and STIR hyperintensity within the intraorbital optic nerves possibly reflecting sequela optic neuritis. As such, he was empirically started on steroids for c/f optic neuritis. MRI brain C and T spine w/ and w/o contrast were obtained which did not show any demyelination. Ophtho eval was more suggestion of NAION. Thus LP was not pursued and steroids were discontinued.      #Blurred vision likely NAION.  - c/w aspirin 81 qD and atorvastatin 80mg qHS  - Pt requesting TTE be done on an outpatient basis.  - follow-up with neuro-ophthalmology and stroke outpatient  - . TSH 1.15. A1c 5.4    Exam on discharge:  Physical Exam: General: Well-developed, well nourished, in no acute distress.  Eyes: Conjunctiva and sclera clear.  Neurologic:  - Mental Status:  Alert, awake, oriented to person, place, and time; Speech is fluent with intact naming, repetition, and comprehension; Good overall fund of knowledge.  - Cranial Nerves II-XII:    II:  Visual fields full OD, inferior nasal visual field cut OS; RAPD; Pupils are equal, round and reactive  III, IV, VI:  Extraocular movements are intact without nystagmus.  V:  Facial sensation is intact in the V1-V3 distribution bilaterally.  VII:  Face is symmetric with normal eye closure and smile  VIII:  Hearing is intact to finger rub.  IX, X:  Uvula is midline and soft palate rises symmetrically  XI:  Head turning and shoulder shrug are intact.  XII:  Tongue protrudes in the midline.  - Motor:  Strength is 5/5 throughout. There is no pronator drift.  Normal muscle bulk and tone throughout.  - Reflexes:  2+ and symmetric at the biceps, triceps, brachioradialis, knees, and ankles.  Plantar responses flexor.  - Sensory:  Intact throughout to vibration, and joint-position, light touch, pin prick.  - Coordination:  Finger-nose-finger and heel-knee-shin intact without dysmetria.  Rapid alternating hand movements intact.  - Gait: Narrow, steady. Romberg testing is negative.    New medications on discharge: None  Labs to be followed up: None  Imaging to be done as outpatient: TTE w/o contrast w/doppler  Further outpatient workup: f/u w/Dr. Gibbs within 2 weeks and Dr. Adrian within 2 weeks. 60M PMHx of HLD sent by optometrist for left optic disc edema, 3 days of left eye blurry vision, flashes/floaters. CTH/CTA negative for acute stroke. MRI with  prominent optic nerve sheath fluid c/f papilledema and STIR hyperintensity within the intraorbital optic nerves possibly reflecting sequela optic neuritis. As such, he was empirically started on steroids for c/f optic neuritis. MRI brain C and T spine w/ and w/o contrast were obtained which did not show any demyelination. Ophtho eval was more suggestion of NAION. Thus LP was not pursued and steroids were discontinued.      #Blurred vision likely NAION.  - c/w aspirin 81 qD and atorvastatin 80mg qHS  - Pt requesting TTE be done on an outpatient basis.  - follow-up with neuro-ophthalmology and stroke outpatient  - . TSH 1.15. A1c 5.4    Exam on discharge:  Physical Exam: General: Well-developed, well nourished, in no acute distress.  Eyes: Conjunctiva and sclera clear.  Neurologic:  - Mental Status:  Alert, awake, oriented to person, place, and time; Speech is fluent with intact naming, repetition, and comprehension; Good overall fund of knowledge.  - Cranial Nerves II-XII:    II:  Visual fields full OD, inferior nasal visual field cut OS; RAPD; Pupils are equal, round and reactive  III, IV, VI:  Extraocular movements are intact without nystagmus.  V:  Facial sensation is intact in the V1-V3 distribution bilaterally.  VII:  Face is symmetric with normal eye closure and smile  VIII:  Hearing is intact to finger rub.  IX, X:  Uvula is midline and soft palate rises symmetrically  XI:  Head turning and shoulder shrug are intact.  XII:  Tongue protrudes in the midline.  - Motor:  Strength is 5/5 throughout. There is no pronator drift.  Normal muscle bulk and tone throughout.  - Reflexes:  2+ and symmetric at the biceps, triceps, brachioradialis, knees, and ankles.  Plantar responses flexor.  - Sensory:  Intact throughout to vibration, and joint-position, light touch, pin prick.  - Coordination:  Finger-nose-finger and heel-knee-shin intact without dysmetria.  Rapid alternating hand movements intact.  - Gait: Narrow, steady. Romberg testing is negative.    New medications on discharge: aspirin 81 mg daily and atorvastatin 80 mg nightly  Labs to be followed up: None  Imaging to be done as outpatient: TTE w/o contrast w/doppler  Further outpatient workup: f/u w/Dr. Gibbs within 2 weeks and Dr. Adrian within 2 weeks.

## 2025-01-27 NOTE — PROGRESS NOTE ADULT - ASSESSMENT
60M PMHx of HLD sent by optometrist for left optic disc edema, 3 days of left eye blurry vision, flashes/floaters. CTH/CTA negative for acute stroke. MRI with papilledema and hyperintensity of intraorbital optic nerve, concerning for optic neuritis vs non-arteritic anterior ischemic optic neuropathy (NAION)    #Blurry vision: likely Optic neuritis vs NAION  - management per primary     #HLD  -cw atorvastatin 40mg QD    #Hyponatremia: corrected 133. likely hypovolemic. asymptomatic   - ctm    #DVT ppx  -c/w lovenox

## 2025-01-27 NOTE — OCCUPATIONAL THERAPY INITIAL EVALUATION ADULT - DIAGNOSIS, OT EVAL
Pt presents with impaired L eye inferior nasal quadrant however no other deficits impacting their ability to independently complete ADLs, functional transfers, and functional mobility.

## 2025-01-27 NOTE — DISCHARGE NOTE NURSING/CASE MANAGEMENT/SOCIAL WORK - PATIENT PORTAL LINK FT
You can access the FollowMyHealth Patient Portal offered by [facility] by registering at the following website: [email or web address]. By joining [employer]’s FollowMyHealth portal, you will also be able to view your health information using other applications (apps) compatible with our system.

## 2025-01-27 NOTE — PROGRESS NOTE ADULT - SUBJECTIVE AND OBJECTIVE BOX
Patient is a 60y old  Male who presents with a chief complaint of Vision change (26 Jan 2025 12:03)        SUBJECTIVE:  Patient was seen and examined at bedside, no acute complaints    Overnight Events : NAEON    Last Bowel Movement: 25-Jan-2025 (01-27)  Last Bowel Movement: 25-Jan-2025 (01-26)  Last Bowel Movement: 25-Jan-2025 (01-26)  Last Bowel Movement: 25-Jan-2025 (01-26)      Review of systems: 12 point Review of systems negative unless otherwise documented elsewhere in note.     Diet, Regular (01-26-25 @ 00:11) [Active]      MEDICATIONS:  MEDICATIONS  (STANDING):  aspirin enteric coated 81 milliGRAM(s) Oral daily  atorvastatin 80 milliGRAM(s) Oral at bedtime  dextrose 5%. 1000 milliLiter(s) (100 mL/Hr) IV Continuous <Continuous>  dextrose 5%. 1000 milliLiter(s) (50 mL/Hr) IV Continuous <Continuous>  dextrose 50% Injectable 25 Gram(s) IV Push once  dextrose 50% Injectable 12.5 Gram(s) IV Push once  dextrose 50% Injectable 25 Gram(s) IV Push once  enoxaparin Injectable 40 milliGRAM(s) SubCutaneous every 24 hours  glucagon  Injectable 1 milliGRAM(s) IntraMuscular once  influenza   Vaccine 0.5 milliLiter(s) IntraMuscular once  insulin lispro (ADMELOG) corrective regimen sliding scale   SubCutaneous Before meals and at bedtime  methylPREDNISolone sodium succinate IVPB 1000 milliGRAM(s) IV Intermittent daily  pantoprazole    Tablet 40 milliGRAM(s) Oral before breakfast    MEDICATIONS  (PRN):  dextrose Oral Gel 15 Gram(s) Oral once PRN Blood Glucose LESS THAN 70 milliGRAM(s)/deciliter      Allergies    No Known Allergies    Intolerances        OBJECTIVE:  Vital Signs Last 24 Hrs  T(C): 36.6 (27 Jan 2025 06:23), Max: 36.6 (27 Jan 2025 06:23)  T(F): 97.8 (27 Jan 2025 06:23), Max: 97.8 (27 Jan 2025 06:23)  HR: 70 (27 Jan 2025 06:23) (69 - 82)  BP: 121/67 (27 Jan 2025 06:23) (121/67 - 128/72)  BP(mean): 91 (26 Jan 2025 20:55) (91 - 91)  RR: 18 (27 Jan 2025 06:23) (18 - 18)  SpO2: 94% (27 Jan 2025 06:23) (92% - 94%)    Parameters below as of 27 Jan 2025 06:23  Patient On (Oxygen Delivery Method): room air      I&O's Summary      PHYSICAL EXAM:  Gen: Resting in bed at time of exam, not in distress   CV: RRR, +S1/S2  Pulm: no wheezing , no crackles  no increase in work of breathing  Abd: soft, NTND  Skin: warm and dry, no new rashes   Ext: no edema    Psych: affect and behavior appropriate, pleasant at time of interview    LABS:                        16.5   7.57  )-----------( 170      ( 26 Jan 2025 05:30 )             47.0     01-26    132[L]  |  95[L]  |  13  ----------------------------<  126[H]  4.0   |  23  |  0.94    Ca    9.5      26 Jan 2025 05:30  Phos  2.1     01-26  Mg     2.0     01-26    TPro  7.7  /  Alb  4.6  /  TBili  1.1  /  DBili  x   /  AST  26  /  ALT  23  /  AlkPhos  85  01-26    LIVER FUNCTIONS - ( 26 Jan 2025 05:30 )  Alb: 4.6 g/dL / Pro: 7.7 g/dL / ALK PHOS: 85 U/L / ALT: 23 U/L / AST: 26 U/L / GGT: x           PT/INR - ( 25 Jan 2025 23:38 )   PT: 11.2 sec;   INR: 0.96          PTT - ( 25 Jan 2025 23:38 )  PTT:34.2 sec  CAPILLARY BLOOD GLUCOSE      POCT Blood Glucose.: 160 mg/dL (27 Jan 2025 08:26)  POCT Blood Glucose.: 194 mg/dL (26 Jan 2025 22:21)  POCT Blood Glucose.: 196 mg/dL (26 Jan 2025 17:44)  POCT Blood Glucose.: 198 mg/dL (26 Jan 2025 12:41)    Urinalysis Basic - ( 26 Jan 2025 05:30 )    Color: x / Appearance: x / SG: x / pH: x  Gluc: 126 mg/dL / Ketone: x  / Bili: x / Urobili: x   Blood: x / Protein: x / Nitrite: x   Leuk Esterase: x / RBC: x / WBC x   Sq Epi: x / Non Sq Epi: x / Bacteria: x        MICRODATA:      RADIOLOGY/OTHER STUDIES:

## 2025-01-27 NOTE — DISCHARGE NOTE PROVIDER - NSDCHC_MEDRECSTATUS_GEN_ALL_CORE
Admission Reconciliation is Completed  Discharge Reconciliation is Not Complete Admission Reconciliation is Completed  Discharge Reconciliation is Completed Cimzia Pregnancy And Lactation Text: This medication crosses the placenta but can be considered safe in certain situations. Cimzia may be excreted in breast milk.

## 2025-01-27 NOTE — OCCUPATIONAL THERAPY INITIAL EVALUATION ADULT - GENERAL OBSERVATIONS, REHAB EVAL
OT IE Completed. Pt's ALISTAIR Stark cleared pt for OT. Pt received semisupine in bed, +heplock, room air, NAD, agreeable to OT. Pt tolerated session fairly. Pt left as found, all lines in tact, needs in reach, +call vázquez. ALISTAIR Stark aware.

## 2025-01-27 NOTE — DISCHARGE NOTE PROVIDER - CARE PROVIDERS DIRECT ADDRESSES
[email or web address]

## 2025-01-27 NOTE — OCCUPATIONAL THERAPY INITIAL EVALUATION ADULT - ORIENTATION, REHAB EVAL
Please notify patient that he is negative for hepatitis A, B and C.   oriented to person, place, time and situation

## 2025-01-27 NOTE — DISCHARGE NOTE NURSING/CASE MANAGEMENT/SOCIAL WORK - FINANCIAL ASSISTANCE
[facility] provides services at a reduced cost to those who are determined to be eligible through [facility]’s financial assistance program. Information regarding [facility]’s financial assistance program can be found by going to [email or web address] or by calling [phone].

## 2025-01-27 NOTE — OCCUPATIONAL THERAPY INITIAL EVALUATION ADULT - PERTINENT HX OF CURRENT PROBLEM, REHAB EVAL
60M PMHx of HLD sent by optometrist for left optic disc edema, 3 days of left eye blurry vision, flashes/floaters. CTH/CTA negative for acute stroke. MRI with papilledema and hyperintensity of intraorbital optic nerve, concerning for optic neuritis vs non-arteritic anterior ischemic optic neuropathy (NAION)

## 2025-01-27 NOTE — DISCHARGE NOTE PROVIDER - NSDCCPCAREPLAN_GEN_ALL_CORE_FT
PRINCIPAL DISCHARGE DIAGNOSIS  Diagnosis: NAION (non-arteritic anterior ischemic optic neuropathy), right  Assessment and Plan of Treatment:      PRINCIPAL DISCHARGE DIAGNOSIS  Diagnosis: NAION (non-arteritic anterior ischemic optic neuropathy), right  Assessment and Plan of Treatment: You came to the hospital for vision loss. You saw the eye doctor and had an MRI which was consistent with NAION. This is similar to a stroke, but in your eye. You should continue to take Aspirin and a statin when you are discharged.   You should follow up with neuroophthamology and stroke when you are discharged. If you develop any new or worsening symptoms you should return to the ER.     PRINCIPAL DISCHARGE DIAGNOSIS  Diagnosis: NAION (non-arteritic anterior ischemic optic neuropathy), right  Assessment and Plan of Treatment: You came to the hospital for vision loss. You saw the eye doctor and had an MRI which was consistent with NAION. This is similar to a stroke, but in your eye. You should continue to take Aspirin and a statin when you are discharged.   You should follow up with neuroophthamology and stroke when you are discharged. Please get a heart ultrasound outpatient. If you develop any new or worsening symptoms you should return to the ER.

## 2025-01-27 NOTE — OCCUPATIONAL THERAPY INITIAL EVALUATION ADULT - ADDITIONAL COMMENTS
Pt lives in an apartment with 1FOS to enter. Pt reports that prior to admission, pt was independent in all ADLs and IADLs, and ambulates with no device. Pt is R hand dominant, wears glasses for reading.

## 2025-01-27 NOTE — DISCHARGE NOTE PROVIDER - CARE PROVIDER_API CALL
Kerri Gibbs  Neurology  [address] [address]  Phone: [phone]  Fax: [phone]  Follow Up Time: 2 weeks    See Adrian  Ophthalmology  [address] [address]  Phone: [phone]  Fax: [phone]  Follow Up Time: 2 weeks

## 2025-01-27 NOTE — DISCHARGE NOTE PROVIDER - PROVIDER TOKENS
PROVIDER:[TOKEN:[[id number]],FOLLOWUP:[2 weeks]],PROVIDER:[TOKEN:[[id number]],FOLLOWUP:[2 weeks]]

## 2025-01-28 ENCOUNTER — NON-APPOINTMENT (OUTPATIENT)
Age: 60
End: 2025-01-28

## 2025-01-28 LAB
ANA TITR SER: NEGATIVE — SIGNIFICANT CHANGE UP
APCR PPP: 2.8 RATIO — SIGNIFICANT CHANGE UP
AT III ACT/NOR PPP CHRO: 108 % — SIGNIFICANT CHANGE UP (ref 85–135)
AUTO DIFF PNL BLD: ABNORMAL
B2 GLYCOPROT1 IGA SER QL: <2 U/ML — SIGNIFICANT CHANGE UP
B2 GLYCOPROT1 IGG SER-ACNC: <1.4 U/ML — SIGNIFICANT CHANGE UP
B2 GLYCOPROT1 IGM SER-ACNC: 17.4 U/ML — SIGNIFICANT CHANGE UP
C-ANCA SER-ACNC: NEGATIVE — SIGNIFICANT CHANGE UP
CARDIOLIPIN IGM SER-MCNC: 20.4 MPL U/ML — HIGH
CARDIOLIPIN IGM SER-MCNC: <1.6 GPL U/ML — SIGNIFICANT CHANGE UP
DEPRECATED CARDIOLIPIN IGA SER: <2 APL U/ML — SIGNIFICANT CHANGE UP
HCYS SERPL-MCNC: 10.3 UMOL/L — SIGNIFICANT CHANGE UP
INTERPRETATION SERPL IFE-IMP: SIGNIFICANT CHANGE UP
P-ANCA SER-ACNC: NEGATIVE — SIGNIFICANT CHANGE UP
PROT C ACT/NOR PPP: 184 % — HIGH (ref 74–150)

## 2025-01-29 LAB
GAMMA INTERFERON BACKGROUND BLD IA-ACNC: 0.02 IU/ML — SIGNIFICANT CHANGE UP
M TB IFN-G BLD-IMP: NEGATIVE — SIGNIFICANT CHANGE UP
M TB IFN-G CD4+ BCKGRND COR BLD-ACNC: 0 IU/ML — SIGNIFICANT CHANGE UP
M TB IFN-G CD4+CD8+ BCKGRND COR BLD-ACNC: 0.01 IU/ML — SIGNIFICANT CHANGE UP
QUANT TB PLUS MITOGEN MINUS NIL: >10 IU/ML — SIGNIFICANT CHANGE UP
VZV DNA, PCR RESULT: NEGATIVE — SIGNIFICANT CHANGE UP

## 2025-01-30 LAB
DRVVT RATIO: 1.02 RATIO — SIGNIFICANT CHANGE UP (ref 0–1.03)
DRVVT SCREEN TO CONFIRM RATIO: SIGNIFICANT CHANGE UP
PROT S FREE PPP-ACNC: 100 % — SIGNIFICANT CHANGE UP (ref 63–140)

## 2025-02-02 LAB — MOG AB SER QL CBA IFA: NEGATIVE — SIGNIFICANT CHANGE UP

## 2025-02-04 DIAGNOSIS — H47.011 ISCHEMIC OPTIC NEUROPATHY, RIGHT EYE: ICD-10-CM

## 2025-02-04 DIAGNOSIS — E78.5 HYPERLIPIDEMIA, UNSPECIFIED: ICD-10-CM

## 2025-02-04 DIAGNOSIS — E87.1 HYPO-OSMOLALITY AND HYPONATREMIA: ICD-10-CM

## 2025-02-04 LAB
AMPA-RECEPTOR ANTIBODY BY CBA, SERUM: NEGATIVE — SIGNIFICANT CHANGE UP
AMPHIPHYSIN AB TITR SER: NEGATIVE — SIGNIFICANT CHANGE UP
AQP4 H2O CHANNEL AB SERPL IA-ACNC: NEGATIVE — SIGNIFICANT CHANGE UP
CASPR2-IGG CBA, SERUM: NEGATIVE — SIGNIFICANT CHANGE UP
CRMP-5-IGG WESTERN BLOT: NEGATIVE — SIGNIFICANT CHANGE UP
GABA-B-RECEPTOR ANTIBODY BY CBA, SERUM: NEGATIVE — SIGNIFICANT CHANGE UP
GAD65 AB SER-MCNC: 0.01 NMOL/L — SIGNIFICANT CHANGE UP
GFAP ALPHA IGG SER QL IF: NEGATIVE — SIGNIFICANT CHANGE UP
GLIAL NUC TYPE 1 AB TITR SER: NEGATIVE — SIGNIFICANT CHANGE UP
HU1 AB TITR SER: NEGATIVE — SIGNIFICANT CHANGE UP
HU2 AB TITR SER IF: NEGATIVE — SIGNIFICANT CHANGE UP
HU3 AB TITR SER: NEGATIVE — SIGNIFICANT CHANGE UP
IFA NOTES: SIGNIFICANT CHANGE UP
IMMUNOLOGIST REVIEW: SIGNIFICANT CHANGE UP
LGI1-IGG CBA, SERUM: NEGATIVE — SIGNIFICANT CHANGE UP
MGLUR1 IGG SER QL IF: NEGATIVE — SIGNIFICANT CHANGE UP
NEUROCHONDRIN AB SERPL QL IF: NEGATIVE — SIGNIFICANT CHANGE UP
NEUROFILAMENT LIGHT CHAIN RESULT: 7.3 PG/ML — SIGNIFICANT CHANGE UP
NMDAR1 IGG SER QL CBA IFA: NEGATIVE — SIGNIFICANT CHANGE UP
PCA-1 AB TITR SER: NEGATIVE — SIGNIFICANT CHANGE UP
PCA-2 AB TITR SER: NEGATIVE — SIGNIFICANT CHANGE UP
PCA-TR AB TITR SER: NEGATIVE — SIGNIFICANT CHANGE UP